# Patient Record
Sex: FEMALE | Race: WHITE | Employment: OTHER | URBAN - METROPOLITAN AREA
[De-identification: names, ages, dates, MRNs, and addresses within clinical notes are randomized per-mention and may not be internally consistent; named-entity substitution may affect disease eponyms.]

---

## 2021-03-14 ENCOUNTER — APPOINTMENT (OUTPATIENT)
Dept: CT IMAGING | Age: 86
DRG: 378 | End: 2021-03-14
Payer: MEDICARE

## 2021-03-14 ENCOUNTER — HOSPITAL ENCOUNTER (INPATIENT)
Age: 86
LOS: 3 days | Discharge: HOME HEALTH | DRG: 378 | End: 2021-03-17
Attending: EMERGENCY MEDICINE | Admitting: INTERNAL MEDICINE
Payer: MEDICARE

## 2021-03-14 ENCOUNTER — APPOINTMENT (OUTPATIENT)
Dept: GENERAL RADIOLOGY | Age: 86
DRG: 378 | End: 2021-03-14
Payer: MEDICARE

## 2021-03-14 DIAGNOSIS — K62.5 RECTAL BLEEDING: Primary | ICD-10-CM

## 2021-03-14 PROBLEM — K92.2 GI BLEED: Status: ACTIVE | Noted: 2021-03-14

## 2021-03-14 LAB
ALBUMIN SERPL-MCNC: 3.3 GM/DL (ref 3.4–5)
ALP BLD-CCNC: 50 IU/L (ref 40–129)
ALT SERPL-CCNC: 17 U/L (ref 10–40)
ANION GAP SERPL CALCULATED.3IONS-SCNC: 9 MMOL/L (ref 4–16)
APTT: 24.8 SECONDS (ref 25.1–37.1)
AST SERPL-CCNC: 21 IU/L (ref 15–37)
BASOPHILS ABSOLUTE: 0.1 K/CU MM
BASOPHILS RELATIVE PERCENT: 0.8 % (ref 0–1)
BILIRUB SERPL-MCNC: 0.4 MG/DL (ref 0–1)
BUN BLDV-MCNC: 26 MG/DL (ref 6–23)
CALCIUM SERPL-MCNC: 8.4 MG/DL (ref 8.3–10.6)
CHLORIDE BLD-SCNC: 104 MMOL/L (ref 99–110)
CO2: 26 MMOL/L (ref 21–32)
CREAT SERPL-MCNC: 0.9 MG/DL (ref 0.6–1.1)
DIFFERENTIAL TYPE: ABNORMAL
EOSINOPHILS ABSOLUTE: 0.1 K/CU MM
EOSINOPHILS RELATIVE PERCENT: 1.6 % (ref 0–3)
GFR AFRICAN AMERICAN: >60 ML/MIN/1.73M2
GFR NON-AFRICAN AMERICAN: 58 ML/MIN/1.73M2
GLUCOSE BLD-MCNC: 110 MG/DL (ref 70–99)
HCT VFR BLD CALC: 35.5 % (ref 37–47)
HCT VFR BLD CALC: 39.6 % (ref 37–47)
HEMOGLOBIN: 11.6 GM/DL (ref 12.5–16)
HEMOGLOBIN: 12.5 GM/DL (ref 12.5–16)
IMMATURE NEUTROPHIL %: 0.4 % (ref 0–0.43)
INR BLD: 1.03 INDEX
LYMPHOCYTES ABSOLUTE: 1.5 K/CU MM
LYMPHOCYTES RELATIVE PERCENT: 19.4 % (ref 24–44)
MCH RBC QN AUTO: 32.3 PG (ref 27–31)
MCHC RBC AUTO-ENTMCNC: 31.6 % (ref 32–36)
MCV RBC AUTO: 102.3 FL (ref 78–100)
MONOCYTES ABSOLUTE: 0.6 K/CU MM
MONOCYTES RELATIVE PERCENT: 7.4 % (ref 0–4)
NUCLEATED RBC %: 0 %
PDW BLD-RTO: 13.5 % (ref 11.7–14.9)
PLATELET # BLD: 231 K/CU MM (ref 140–440)
PMV BLD AUTO: 10.4 FL (ref 7.5–11.1)
POTASSIUM SERPL-SCNC: 3.9 MMOL/L (ref 3.5–5.1)
PROTHROMBIN TIME: 12.5 SECONDS (ref 11.7–14.5)
RBC # BLD: 3.87 M/CU MM (ref 4.2–5.4)
SEGMENTED NEUTROPHILS ABSOLUTE COUNT: 5.4 K/CU MM
SEGMENTED NEUTROPHILS RELATIVE PERCENT: 70.4 % (ref 36–66)
SODIUM BLD-SCNC: 139 MMOL/L (ref 135–145)
TOTAL IMMATURE NEUTOROPHIL: 0.03 K/CU MM
TOTAL NUCLEATED RBC: 0 K/CU MM
TOTAL PROTEIN: 5.8 GM/DL (ref 6.4–8.2)
WBC # BLD: 7.7 K/CU MM (ref 4–10.5)

## 2021-03-14 PROCEDURE — 85018 HEMOGLOBIN: CPT

## 2021-03-14 PROCEDURE — 85025 COMPLETE CBC W/AUTO DIFF WBC: CPT

## 2021-03-14 PROCEDURE — 6370000000 HC RX 637 (ALT 250 FOR IP): Performed by: INTERNAL MEDICINE

## 2021-03-14 PROCEDURE — 36415 COLL VENOUS BLD VENIPUNCTURE: CPT

## 2021-03-14 PROCEDURE — 85014 HEMATOCRIT: CPT

## 2021-03-14 PROCEDURE — 94761 N-INVAS EAR/PLS OXIMETRY MLT: CPT

## 2021-03-14 PROCEDURE — 99285 EMERGENCY DEPT VISIT HI MDM: CPT

## 2021-03-14 PROCEDURE — 85730 THROMBOPLASTIN TIME PARTIAL: CPT

## 2021-03-14 PROCEDURE — 74174 CTA ABD&PLVS W/CONTRAST: CPT

## 2021-03-14 PROCEDURE — C9113 INJ PANTOPRAZOLE SODIUM, VIA: HCPCS | Performed by: PHYSICIAN ASSISTANT

## 2021-03-14 PROCEDURE — 71045 X-RAY EXAM CHEST 1 VIEW: CPT

## 2021-03-14 PROCEDURE — 6360000004 HC RX CONTRAST MEDICATION: Performed by: EMERGENCY MEDICINE

## 2021-03-14 PROCEDURE — 85610 PROTHROMBIN TIME: CPT

## 2021-03-14 PROCEDURE — 1200000000 HC SEMI PRIVATE

## 2021-03-14 PROCEDURE — 6360000002 HC RX W HCPCS: Performed by: INTERNAL MEDICINE

## 2021-03-14 PROCEDURE — 6370000000 HC RX 637 (ALT 250 FOR IP): Performed by: PHYSICIAN ASSISTANT

## 2021-03-14 PROCEDURE — 96374 THER/PROPH/DIAG INJ IV PUSH: CPT

## 2021-03-14 PROCEDURE — 80053 COMPREHEN METABOLIC PANEL: CPT

## 2021-03-14 PROCEDURE — 93005 ELECTROCARDIOGRAM TRACING: CPT | Performed by: PHYSICIAN ASSISTANT

## 2021-03-14 PROCEDURE — 6360000002 HC RX W HCPCS: Performed by: PHYSICIAN ASSISTANT

## 2021-03-14 PROCEDURE — 93010 ELECTROCARDIOGRAM REPORT: CPT | Performed by: INTERNAL MEDICINE

## 2021-03-14 PROCEDURE — C9113 INJ PANTOPRAZOLE SODIUM, VIA: HCPCS | Performed by: INTERNAL MEDICINE

## 2021-03-14 RX ORDER — PANTOPRAZOLE SODIUM 40 MG/10ML
40 INJECTION, POWDER, LYOPHILIZED, FOR SOLUTION INTRAVENOUS 2 TIMES DAILY
Status: DISCONTINUED | OUTPATIENT
Start: 2021-03-14 | End: 2021-03-17

## 2021-03-14 RX ORDER — TELMISARTAN 80 MG/1
80 TABLET ORAL DAILY
COMMUNITY

## 2021-03-14 RX ORDER — MULTIVIT-MIN/IRON/FOLIC ACID/K 18-600-40
2000 CAPSULE ORAL NIGHTLY
COMMUNITY

## 2021-03-14 RX ORDER — ASCORBIC ACID 500 MG
1000 TABLET ORAL DAILY
Status: DISCONTINUED | OUTPATIENT
Start: 2021-03-14 | End: 2021-03-17 | Stop reason: HOSPADM

## 2021-03-14 RX ORDER — ONDANSETRON 2 MG/ML
4 INJECTION INTRAMUSCULAR; INTRAVENOUS EVERY 6 HOURS PRN
Status: DISCONTINUED | OUTPATIENT
Start: 2021-03-14 | End: 2021-03-17 | Stop reason: HOSPADM

## 2021-03-14 RX ORDER — ACETAMINOPHEN 325 MG/1
650 TABLET ORAL EVERY 6 HOURS PRN
Status: DISCONTINUED | OUTPATIENT
Start: 2021-03-14 | End: 2021-03-17 | Stop reason: HOSPADM

## 2021-03-14 RX ORDER — SODIUM CHLORIDE 0.9 % (FLUSH) 0.9 %
10 SYRINGE (ML) INJECTION EVERY 12 HOURS SCHEDULED
Status: DISCONTINUED | OUTPATIENT
Start: 2021-03-14 | End: 2021-03-17 | Stop reason: HOSPADM

## 2021-03-14 RX ORDER — PROMETHAZINE HYDROCHLORIDE 12.5 MG/1
12.5 TABLET ORAL EVERY 6 HOURS PRN
Status: DISCONTINUED | OUTPATIENT
Start: 2021-03-14 | End: 2021-03-17 | Stop reason: HOSPADM

## 2021-03-14 RX ORDER — ACETAMINOPHEN 650 MG/1
650 SUPPOSITORY RECTAL EVERY 6 HOURS PRN
Status: DISCONTINUED | OUTPATIENT
Start: 2021-03-14 | End: 2021-03-17 | Stop reason: HOSPADM

## 2021-03-14 RX ORDER — DILTIAZEM HYDROCHLORIDE 240 MG/1
240 CAPSULE, COATED, EXTENDED RELEASE ORAL DAILY
Status: DISCONTINUED | OUTPATIENT
Start: 2021-03-15 | End: 2021-03-17 | Stop reason: HOSPADM

## 2021-03-14 RX ORDER — PANTOPRAZOLE SODIUM 40 MG/10ML
40 INJECTION, POWDER, LYOPHILIZED, FOR SOLUTION INTRAVENOUS ONCE
Status: COMPLETED | OUTPATIENT
Start: 2021-03-14 | End: 2021-03-14

## 2021-03-14 RX ORDER — LEVOTHYROXINE SODIUM 0.07 MG/1
75 TABLET ORAL DAILY
COMMUNITY

## 2021-03-14 RX ORDER — GABAPENTIN 300 MG/1
300 CAPSULE ORAL NIGHTLY
COMMUNITY

## 2021-03-14 RX ORDER — MULTIVIT WITH MINERALS/LUTEIN
1000 TABLET ORAL DAILY
COMMUNITY

## 2021-03-14 RX ORDER — ASPIRIN 81 MG/1
81 TABLET, CHEWABLE ORAL DAILY
COMMUNITY

## 2021-03-14 RX ORDER — MAGNESIUM GLUCONATE 27 MG(500)
500 TABLET ORAL DAILY
COMMUNITY

## 2021-03-14 RX ORDER — LEVOTHYROXINE SODIUM 0.07 MG/1
75 TABLET ORAL DAILY
Status: DISCONTINUED | OUTPATIENT
Start: 2021-03-15 | End: 2021-03-17 | Stop reason: HOSPADM

## 2021-03-14 RX ORDER — SODIUM CHLORIDE 0.9 % (FLUSH) 0.9 %
10 SYRINGE (ML) INJECTION PRN
Status: DISCONTINUED | OUTPATIENT
Start: 2021-03-14 | End: 2021-03-17 | Stop reason: HOSPADM

## 2021-03-14 RX ORDER — MULTIVIT-MIN/IRON/FOLIC ACID/K 18-600-40
20000 CAPSULE ORAL NIGHTLY
Status: DISCONTINUED | OUTPATIENT
Start: 2021-03-14 | End: 2021-03-14

## 2021-03-14 RX ORDER — DILTIAZEM HYDROCHLORIDE 240 MG/1
240 CAPSULE, EXTENDED RELEASE ORAL DAILY
Status: ON HOLD | COMMUNITY
End: 2021-04-02 | Stop reason: HOSPADM

## 2021-03-14 RX ORDER — ACETAMINOPHEN 500 MG
500 TABLET ORAL ONCE
Status: COMPLETED | OUTPATIENT
Start: 2021-03-14 | End: 2021-03-14

## 2021-03-14 RX ORDER — GABAPENTIN 300 MG/1
300 CAPSULE ORAL NIGHTLY
Status: DISCONTINUED | OUTPATIENT
Start: 2021-03-14 | End: 2021-03-17 | Stop reason: HOSPADM

## 2021-03-14 RX ADMIN — PANTOPRAZOLE SODIUM 40 MG: 40 INJECTION, POWDER, FOR SOLUTION INTRAVENOUS at 18:06

## 2021-03-14 RX ADMIN — OXYCODONE HYDROCHLORIDE AND ACETAMINOPHEN 1000 MG: 500 TABLET ORAL at 18:06

## 2021-03-14 RX ADMIN — PANTOPRAZOLE SODIUM 40 MG: 40 INJECTION, POWDER, FOR SOLUTION INTRAVENOUS at 10:29

## 2021-03-14 RX ADMIN — ACETAMINOPHEN 500 MG: 500 TABLET ORAL at 11:41

## 2021-03-14 RX ADMIN — IOPAMIDOL 85 ML: 755 INJECTION, SOLUTION INTRAVENOUS at 11:16

## 2021-03-14 RX ADMIN — GABAPENTIN 300 MG: 300 CAPSULE ORAL at 21:34

## 2021-03-14 RX ADMIN — ACETAMINOPHEN 650 MG: 325 TABLET ORAL at 21:34

## 2021-03-14 SDOH — HEALTH STABILITY: MENTAL HEALTH: HOW OFTEN DO YOU HAVE A DRINK CONTAINING ALCOHOL?: NEVER

## 2021-03-14 ASSESSMENT — PAIN DESCRIPTION - ORIENTATION: ORIENTATION: LEFT

## 2021-03-14 ASSESSMENT — PAIN DESCRIPTION - DESCRIPTORS: DESCRIPTORS: ACHING

## 2021-03-14 ASSESSMENT — PAIN SCALES - GENERAL
PAINLEVEL_OUTOF10: 10
PAINLEVEL_OUTOF10: 5
PAINLEVEL_OUTOF10: 10
PAINLEVEL_OUTOF10: 0

## 2021-03-14 NOTE — CARE COORDINATION
LSW was consulted to assist this pt with discharge planning. Pt here today for GI bleeding. She is status post hysterectomy. This is pt's 1st visit to this hospital.    Pt's son notes that she has had several recent falls, most recent fall was in January of this year and still has some bruising and pain from these falls to her left hip, right elbow. Pt has seen Masoud blankenship for falls. LSW spoke to this pt and pt's son, Brittney Cancino, [phone] also in room. Pt gave verbal permission to speak openly. LSW explained CM role. Brittney Cancino is pt's POA and copy given to regethantion as well as LW and both scanned to chart. LSW completed ACP w/pt and she wants to be a DNR/CC. Pt reports she is  and lives alone in a 1 story home. She has some blindness but is able to see objects and self medicates; after Brittney Cancino fills out weekly pill box, which she takes her daily meds from. Brittney Cancino notes how he gonzalez things in her house to see, like microwave buttons, so she can see them. This has been working. Pt does have insurance and can afford her medications. Pt has transportation via family, usually Brittney Cancino. Pt does have a PCP. Pt has the following DME: seated walker w/wheels, BSC, shower chair and craftmatic bed. Pt has [employer] system to call for 911 as well as [place] phone. Héctor villa lives a few doors down and does check on pt and came this am when he heard the 911 call. Brittney Cancino noted there are also other neighbors who can check on pt if needed and he lives 30 minutes away, but sees pt every Friday. He takes her to get her hair done and buys her groceries. Pt is able to prepare simple meals and and he buys her [employer] famous chicken every Friday too, which she will eat throughout the week. Pt does have PP HHA to assist her and her grand dtr also helps out w/bathing. Pt reports she use to go to activities 4 x week @ [facility] but since Covid, she has not gone @ all.  Brittney Cancino noted pt's last fall was 1/29/21 and talked about possible need for PT/OT. He noted HHC would be likely option even if pt requires admission. He does not anticipate any needs for pt @ d/c, whether from ED or IP. LSW did talk about [facility] as well as [facility]-Passport as pt may need these in future and Shraddha José took hand-outs for these services. Again, no other needs noted @ this time. Chiki Reece 1313 notes completed to Physician-hospitalist that pt wants to be DNR/CC. GI consulted in ED and recommended Protonix and admission. Pt being admitted for GI bleed.

## 2021-03-14 NOTE — ED NOTES
Bed: ED-19  Expected date:   Expected time:   Means of arrival:   Comments:  [facility]       Keerthi Barger   03/14/21 4487

## 2021-03-14 NOTE — H&P
past and son says that patient has remained very healthy over the course of her life, only going to see a doctor when needed. Patient recently has been tolerating her diet. Son does state that the individual doing her laundry did notice blood stains in her underwear last week. Ten point ROS reviewed negative, unless as noted above. In ED patient was hypertensive but other vitals were stable. Pertinent labs showed a hemoglobin of 12.5, INR 1.03. CTA A/P showed diverticulosis. Objective:   No intake or output data in the 24 hours ending 03/14/21 1301   Vitals:   Vitals:    03/14/21 1115   BP: (!) 191/62   Pulse: 74   Resp: 20   Temp:    SpO2:      Physical Exam:   GEN Awake female, sitting upright in bed in no apparent distress. Appears given age. EYES Pupils are equally round. No scleral erythema, discharge, or conjunctivitis. HENT Mucous membranes are moist.   NECK Supple, no apparent thyromegaly or masses. RESP Clear to auscultation, no wheezes, rales or rhonchi. CARDIO/VASC S1/S2 auscultated. Regular rate without appreciable murmurs, rubs, or gallops. GI Abdomen is soft without significant tenderness, masses, or guarding   No costovertebral angle tenderness  HEME/LYMPH No petechiae or ecchymoses. MSK No gross joint deformities. SKIN Normal coloration, warm, dry. NEURO Cranial nerves appear grossly intact, normal speech. PSYCH Awake, alert, oriented, affect appropriate    Past Medical History:      Past Medical History:   Diagnosis Date    Back pain     Hyperlipidemia     Hypertension     Hypothyroidism      PSHX:  has a past surgical history that includes Hysterectomy. Allergies: No Known Allergies    FAM HX: Reviewed, non contributory  Soc HX:   Social History     Socioeconomic History    Marital status:       Spouse name: None    Number of children: None    Years of education: None    Highest education level: None   Occupational History    None   Social Needs    Financial resource strain: None    Food insecurity     Worry: None     Inability: None    Transportation needs     Medical: None     Non-medical: None   Tobacco Use    Smoking status: Never Smoker    Smokeless tobacco: Never Used   Substance and Sexual Activity    Alcohol use: Never     Frequency: Never    Drug use: Never    Sexual activity: None   Lifestyle    Physical activity     Days per week: None     Minutes per session: None    Stress: None   Relationships    Social connections     Talks on phone: None     Gets together: None     Attends Episcopalian service: None     Active member of club or organization: None     Attends meetings of clubs or organizations: None     Relationship status: None    Intimate partner violence     Fear of current or ex partner: None     Emotionally abused: None     Physically abused: None     Forced sexual activity: None   Other Topics Concern    None   Social History Narrative    None       Medications:   Medications:    Infusions:   PRN Meds:       Electronically signed by Fatemeh Mathews MD on 3/14/2021 at 1:01 PM

## 2021-03-14 NOTE — ED TRIAGE NOTES
Patient to the ED with complaints of rectal bleeding and left leg pain. Patient reports that rectal bleeding began yesterday, noted to be bright red blood per patient and EMS. Patient denies abdominal pain. Son reports several falls (Nov 2020-Jan 2021), and patient has been complaining of left leg pain since, has been seen by ortho since this time. Rates pain 10/10.

## 2021-03-14 NOTE — ACP (ADVANCE CARE PLANNING)
Advance Care Planning     Advance Care Planning Activator (Inpatient)  Conversation Note      Date of ACP Conversation: 3/14/2021    Conversation Conducted with: Patient with Decision Making Capacity    ACP Activator: Joanna Brown Decision Maker:     Current Designated Health Care Decision Maker:     Primary Decision Maker (Active): Loi Lovett - Child - [phone]  Click here to complete Healthcare Decision Makers including section of the Healthcare Decision Maker Relationship (ie \"Primary\")  Today we documented Decision Maker(s) consistent with ACP documents on file. Care Preferences    Ventilation: \"If you were in your present state of health and suddenly became very ill and were unable to breathe on your own, what would your preference be about the use of a ventilator (breathing machine) if it were available to you? \"      Would the patient desire the use of ventilator (breathing machine)?: no    \"If your health worsens and it becomes clear that your chance of recovery is unlikely, what would your preference be about the use of a ventilator (breathing machine) if it were available to you? \"     Would the patient desire the use of ventilator (breathing machine)?: No      Resuscitation  \"CPR works best to restart the heart when there is a sudden event, like a heart attack, in someone who is otherwise healthy. Unfortunately, CPR does not typically restart the heart for people who have serious health conditions or who are very sick. \"    \"In the event your heart stopped as a result of an underlying serious health condition, would you want attempts to be made to restart your heart (answer \"yes\" for attempt to resuscitate) or would you prefer a natural death (answer \"no\" for do not attempt to resuscitate)? \" no       [] Yes   [] No   Educated Patient / Kristi Omar regarding differences between Advance Directives and portable DNR orders.     Length of ACP Conversation in minutes:  15 Conversation Outcomes:  [x] ACP discussion completed  [] Existing advance directive reviewed with patient; no changes to patient's previously recorded wishes  [] New Advance Directive completed  [] Portable Do Not Rescitate prepared for Provider review and signature  [] POLST/POST/MOLST/MOST prepared for Provider review and signature      Follow-up plan:    [] Schedule follow-up conversation to continue planning  [x] Referred individual to Provider for additional questions/concerns   [] Advised patient/agent/surrogate to review completed ACP document and update if needed with changes in condition, patient preferences or care setting    [x] This note routed to one or more involved healthcare providers      Pt's son, Bubba Pruett noted they need to meet w/pt's PCP so they can have DNR/CC completed, as this is pt's wishes.

## 2021-03-14 NOTE — ED PROVIDER NOTES
I independently examined and evaluated Rubi Byrd. In brief their history revealed patient is here with bright red blood per rectum. Patient was in baseline state of health until this overnight into this morning when she says she awoke to incontinence of stool that was with bright red blood in it. Patient then proceeded to have further bright red bleeding this morning. Patient presently denies any significant abdominal pain but does report pain to her left leg which is chronic since recent falls over the last few months. Patient has been evaluated by orthopedics for this and is slowly recovering. No fall last night. Patient reports that she is not sure what medication she is on but son brings medications none appears that patient is only on aspirin for anticoagulation. Patient has never had a problem like this before. No chest pain or shortness of breath. No weakness. Patient reports \"I am ready to go home\". Son is at bedside he confirms the history. Their focused exam revealed the patient is afebrile, hypertensive and with relative hypoxemia on room air correcting with nasal cannula oxygen of 2 L. The patient appears well for age. Pleasant. Hard of hearing. Smiling. Mucous membranes are moist. Speech is clear. Breathing is unlabored. Abdomen is slightly full and with some mid abdominal and lower abdominal tenderness to palpation. There is no generalized peritoneal signs. No rebound or guarding. Skin is dry. Patient does have some bruising to her left lateral posterior hip which appears old. Mental status is normal. The patient moves all extremities and is without facial droop.      Results for orders placed or performed during the hospital encounter of 03/14/21   CBC auto diff   Result Value Ref Range    WBC 7.7 4.0 - 10.5 K/CU MM    RBC 3.87 (L) 4.2 - 5.4 M/CU MM    Hemoglobin 12.5 12.5 - 16.0 GM/DL    Hematocrit 39.6 37 - 47 %    .3 (H) 78 - 100 FL    MCH 32.3 (H) 27 - 31 PG diagnostic, treatment, and disposition decisions were made by myself in conjunction with the Advanced Practice Provider. For all further details of the patient's emergency department visit, please see the Advanced Practice Provider's documentation.        Crystal Ashby MD  03/14/21 9852

## 2021-03-14 NOTE — ED NOTES
Rectal exam completed per Luis Enrique Grant with this nurse at bedside. Patient tolerated well.       John Bryan RN  03/14/21 1605

## 2021-03-14 NOTE — PLAN OF CARE
Problem: Falls - Risk of:  Goal: Will remain free from falls  Description: Will remain free from falls  Outcome: Ongoing  Goal: Absence of physical injury  Description: Absence of physical injury  Outcome: Ongoing     Problem: Discharge Planning:  Goal: Discharged to appropriate level of care  Description: Discharged to appropriate level of care  Outcome: Ongoing     Problem:  Bowel Function - Altered:  Goal: Bowel elimination is within specified parameters  Description: Bowel elimination is within specified parameters  Outcome: Ongoing     Problem: Fluid Volume - Imbalance:  Goal: Will show no signs and symptoms of excessive bleeding  Description: Will show no signs and symptoms of excessive bleeding  Outcome: Ongoing  Goal: Absence of imbalanced fluid volume signs and symptoms  Description: Absence of imbalanced fluid volume signs and symptoms  Outcome: Ongoing     Problem: Nausea/Vomiting:  Goal: Absence of nausea/vomiting  Description: Absence of nausea/vomiting  Outcome: Ongoing  Goal: Able to drink  Description: Able to drink  Outcome: Ongoing  Goal: Able to eat  Description: Able to eat  Outcome: Ongoing  Goal: Ability to achieve adequate nutritional intake will improve  Description: Ability to achieve adequate nutritional intake will improve  Outcome: Ongoing

## 2021-03-14 NOTE — ED NOTES
Bed: 01TR-01  Expected date:   Expected time:   Means of arrival:   Comments:  Carie Glass RN  03/14/21 0828

## 2021-03-14 NOTE — ED PROVIDER NOTES
eMERGENCY dEPARTMENT eNCOUnter      PCP: Radha Day MD    CHIEF COMPLAINT    Chief Complaint   Patient presents with    Rectal Bleeding     since last night     Leg Pain     left side, fall , already seen ortho       HPI    Ender White is a [place]  [age] y.o. female who presents with son via EMS with GI bleeding. Patient states that she awoke this morning after having a bloody bowel movement in her bed. She ambulated to the restroom and had dripping of bright red blood from her room into the restroom where she had an additional episode of bloody stool. She called her son who drove in, saw what had happened and called EMS. Patient denies any associated abdominal pain, chest pain, shortness of breath, lightheadedness or dizziness. No history of GI bleeding in the past.  She is on a baby aspirin daily. She is status post hysterectomy, denies any other previous abdominal surgeries. Son notes that she has had several recent falls, most recent fall was in January of this year and still has some bruising and pain from these falls to her left hip, right elbow. REVIEW OF SYSTEMS    Constitutional:  Denies fever, chills, weight loss or weakness   HENT:  Denies sore throat or ear pain   Cardiovascular:  Denies chest pain, palpitations or swelling   Respiratory:  Denies cough or shortness of breath   GI:  See HPI above  : No hematuria or dysuria. No vaginal symptoms. Musculoskeletal:  Denies back pain or groin pain or masses.  See HPI  Skin:  Denies rash  Neurologic:  Denies headache, focal weakness or sensory changes   Endocrine:  Denies polyuria or polydypsia   Lymphatic:  Denies swollen glands     All other review of systems are negative  See HPI and nursing notes for additional information     PAST MEDICAL & SURGICAL HISTORY    Past Medical History:   Diagnosis Date    Back pain     Hyperlipidemia     Hypertension     Hypothyroidism      Past Surgical History:   Procedure Laterality Date    HYSTERECTOMY         CURRENT MEDICATIONS    Current Outpatient Rx   Medication Sig Dispense Refill    dilTIAZem (DILACOR XR) 240 MG extended release capsule Take 240 mg by mouth daily      telmisartan (MICARDIS) 80 MG tablet Take 80 mg by mouth daily      gabapentin (NEURONTIN) 300 MG capsule Take 300 mg by mouth nightly.  levothyroxine (SYNTHROID) 75 MCG tablet Take 75 mcg by mouth Daily      aspirin 81 MG chewable tablet Take 81 mg by mouth daily      magnesium gluconate (MAGONATE) 500 MG tablet Take 500 mg by mouth daily      Ascorbic Acid (VITAMIN C) 250 MG tablet Take 1,000 mg by mouth daily      Cholecalciferol (VITAMIN D) 50 MCG (2000 UT) CAPS capsule Take 20,000 Units by mouth nightly      Multiple Vitamins-Minerals (WOMENS DAILY FORMULA PO) Take 1 tablet by mouth daily         ALLERGIES    No Known Allergies    SOCIAL AND FAMILY HISTORY    Social History     Socioeconomic History    Marital status:       Spouse name: None    Number of children: None    Years of education: None    Highest education level: None   Occupational History    None   Social Needs    Financial resource strain: None    Food insecurity     Worry: None     Inability: None    Transportation needs     Medical: None     Non-medical: None   Tobacco Use    Smoking status: Never Smoker    Smokeless tobacco: Never Used   Substance and Sexual Activity    Alcohol use: Never     Frequency: Never    Drug use: Never    Sexual activity: None   Lifestyle    Physical activity     Days per week: None     Minutes per session: None    Stress: None   Relationships    Social connections     Talks on phone: None     Gets together: None     Attends Buddhism service: None     Active member of club or organization: None     Attends meetings of clubs or organizations: None     Relationship status: None    Intimate partner violence     Fear of current or ex partner: None     Emotionally abused: None     Physically abused: None     Forced sexual activity: None   Other Topics Concern    None   Social History Narrative    None     History reviewed. No pertinent family history. PHYSICAL EXAM    VITAL SIGNS: BP (!) 191/62   Pulse 74   Temp 98.1 °F (36.7 °C) (Oral)   Resp 20   SpO2 93%   Constitutional:  Well developed, well nourished. No distress  Eyes:  Sclera nonicteric, conjunctiva moist  HENT:  Atraumatic. PERRL. EOMI. Moist mucus membranes. Neck/Lymphatics: supple, no JVD, no swollen nodes  Respiratory:  No retractions, no accessory muscle use, normal breath sounds   Cardiovascular:   normal rate, normal rhythm, no murmurs    GI:    Right half dollar sized area of bruising to lower abdomen    Bowel sounds present, no audible bruits. Soft,  no distention, no guarding, no rigidity,   no abdominal tenderness, no rebound tenderness, no palpable pulsatile masses,   No McBurney's point tenderness   Negative Rovsing sign    Negative Smith's sign. Back:   No CVA tenderness to percussion. Musculoskeletal:  No edema, no deformity  Bruising to left hip  Vascular: DP pulses 2+ equal bilaterally  Integument: Intertriginous rash, dry  Neurologic:  Alert & oriented, normal speech  Psychiatric: Cooperative, pleasant affect         ______________________________________________________________________    Procedures:   Female nurse present. Rectal exam was performed by myself and was negative for significant tenderness. External hemorrhoid. No active bleeding or thrombosis noted. Sphincter tone intact.  + gross blood.      ______________________________________________________________________      LABS:  Results for orders placed or performed during the hospital encounter of 03/14/21   CBC auto diff   Result Value Ref Range    WBC 7.7 4.0 - 10.5 K/CU MM    RBC 3.87 (L) 4.2 - 5.4 M/CU MM    Hemoglobin 12.5 12.5 - 16.0 GM/DL    Hematocrit 39.6 37 - 47 %    .3 (H) 78 - 100 FL    MCH 32.3 (H) 27 - 31 PG    MCHC 31.6 (L) 32.0 - 36.0 %    RDW 13.5 11.7 - 14.9 %    Platelets 475 555 - 943 K/CU MM    MPV 10.4 7.5 - 11.1 FL    Differential Type AUTOMATED DIFFERENTIAL     Segs Relative 70.4 (H) 36 - 66 %    Lymphocytes % 19.4 (L) 24 - 44 %    Monocytes % 7.4 (H) 0 - 4 %    Eosinophils % 1.6 0 - 3 %    Basophils % 0.8 0 - 1 %    Segs Absolute 5.4 K/CU MM    Lymphocytes Absolute 1.5 K/CU MM    Monocytes Absolute 0.6 K/CU MM    Eosinophils Absolute 0.1 K/CU MM    Basophils Absolute 0.1 K/CU MM    Nucleated RBC % 0.0 %    Total Nucleated RBC 0.0 K/CU MM    Total Immature Neutrophil 0.03 K/CU MM    Immature Neutrophil % 0.4 0 - 0.43 %   CMP   Result Value Ref Range    Sodium 139 135 - 145 MMOL/L    Potassium 3.9 3.5 - 5.1 MMOL/L    Chloride 104 99 - 110 mMol/L    CO2 26 21 - 32 MMOL/L    BUN 26 (H) 6 - 23 MG/DL    CREATININE 0.9 0.6 - 1.1 MG/DL    Glucose 110 (H) 70 - 99 MG/DL    Calcium 8.4 8.3 - 10.6 MG/DL    Albumin 3.3 (L) 3.4 - 5.0 GM/DL    Total Protein 5.8 (L) 6.4 - 8.2 GM/DL    Total Bilirubin 0.4 0.0 - 1.0 MG/DL    ALT 17 10 - 40 U/L    AST 21 15 - 37 IU/L    Alkaline Phosphatase 50 40 - 129 IU/L    GFR Non- 58 (L) >60 mL/min/1.73m2    GFR African American >60 >60 mL/min/1.73m2    Anion Gap 9 4 - 16   PT - INR   Result Value Ref Range    Protime 12.5 11.7 - 14.5 SECONDS    INR 1.03 INDEX   APTT   Result Value Ref Range    aPTT 24.8 (L) 25.1 - 37.1 SECONDS   EKG 12 Lead   Result Value Ref Range    Ventricular Rate 75 BPM    Atrial Rate 75 BPM    P-R Interval 202 ms    QRS Duration 82 ms    Q-T Interval 362 ms    QTc Calculation (Bazett) 404 ms    P Axis 19 degrees    R Axis -16 degrees    T Axis 15 degrees    Diagnosis       Normal sinus rhythm  Inferior infarct , age undetermined  Abnormal ECG  No previous ECGs available           RADIOLOGY/PROCEDURES    XR CHEST PORTABLE   Final Result   No acute abnormality. CTA ABDOMEN PELVIS W CONTRAST   Preliminary Result   1.  No evidence of active arterial bleeding on arterial phase imaging. Mildly   prominent arterial vessels in the anterior wall of the rectum and anus. 2. Moderately severe aortoiliac atherosclerotic disease. 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 No significant inflow disease. Bilateral proximal SFA   stenoses. 3. 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 Heavy plaque in the proximal renal   arteries limits evaluation. 4. Colonic diverticulosis with no acute features. ED COURSE & MEDICAL DECISION MAKING       Vital signs and nursing notes reviewed during ED course. Patient care and presentation staffed with supervising MD.  Patient seen by supervising MD today- see his/her note for details of the encounter. Patient presents as above with multiple presents with bright red blood per rectum this morning. She is hypertensive on arrival, afebrile, tachycardic. Abdomen soft, no appreciable tenderness on my exam.  She is mildly hypoxic in the low 90s on room air and placed on 2 L of oxygen. She is given dose of Protonix. Lab work with hemoglobin of 12.5. No leukocytosis. BUN elevated at 26 with normal creatinine. Chest x-ray without acute process. CT imaging of abdomen and pelvis without evidence of active arterial bleeding. There is mildly prominent arterial vessels in the anterior wall of the rectum and anus. Incidental finding of moderately severe aortoiliac atherosclerotic disease. 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 No significant inflow disease. Bilateral proximal SFA stenosis. Chronic diverticulosis without acute features. Patient remains hemodynamically stable throughout ED stay. I discussed patient case with on-call gastroenterologist, Dr. Pallavi Peña. Plan admission for continued observation, she will likely need serial H&H's.   I discussed patient case with hospitalist who is agreeable with admission for

## 2021-03-14 NOTE — ED NOTES
Patient able to stand and pivot onto bedside commode without incident. Continues to complain of pain to left leg, requesting something for pain. Tutu Christianma notified.       Juan Cobb RN  03/14/21 1135

## 2021-03-15 ENCOUNTER — APPOINTMENT (OUTPATIENT)
Dept: ULTRASOUND IMAGING | Age: 86
DRG: 378 | End: 2021-03-15
Payer: MEDICARE

## 2021-03-15 LAB
HCT VFR BLD CALC: 31.6 % (ref 37–47)
HCT VFR BLD CALC: 34.4 % (ref 37–47)
HEMOGLOBIN: 10.3 GM/DL (ref 12.5–16)
HEMOGLOBIN: 11 GM/DL (ref 12.5–16)
IRON: 67 UG/DL (ref 37–145)
PCT TRANSFERRIN: 31 % (ref 10–44)
SARS-COV-2, NAAT: NOT DETECTED
SOURCE: NORMAL
TOTAL IRON BINDING CAPACITY: 213 UG/DL (ref 250–450)
UNSATURATED IRON BINDING CAPACITY: 146 UG/DL (ref 110–370)

## 2021-03-15 PROCEDURE — 87635 SARS-COV-2 COVID-19 AMP PRB: CPT

## 2021-03-15 PROCEDURE — 2580000003 HC RX 258: Performed by: HOSPITALIST

## 2021-03-15 PROCEDURE — 97530 THERAPEUTIC ACTIVITIES: CPT

## 2021-03-15 PROCEDURE — 6370000000 HC RX 637 (ALT 250 FOR IP): Performed by: INTERNAL MEDICINE

## 2021-03-15 PROCEDURE — 36415 COLL VENOUS BLD VENIPUNCTURE: CPT

## 2021-03-15 PROCEDURE — C9113 INJ PANTOPRAZOLE SODIUM, VIA: HCPCS | Performed by: INTERNAL MEDICINE

## 2021-03-15 PROCEDURE — 85014 HEMATOCRIT: CPT

## 2021-03-15 PROCEDURE — 97166 OT EVAL MOD COMPLEX 45 MIN: CPT

## 2021-03-15 PROCEDURE — 93971 EXTREMITY STUDY: CPT

## 2021-03-15 PROCEDURE — 6360000002 HC RX W HCPCS: Performed by: INTERNAL MEDICINE

## 2021-03-15 PROCEDURE — 6370000000 HC RX 637 (ALT 250 FOR IP): Performed by: HOSPITALIST

## 2021-03-15 PROCEDURE — 1200000000 HC SEMI PRIVATE

## 2021-03-15 PROCEDURE — 97162 PT EVAL MOD COMPLEX 30 MIN: CPT

## 2021-03-15 PROCEDURE — 97535 SELF CARE MNGMENT TRAINING: CPT

## 2021-03-15 PROCEDURE — 97116 GAIT TRAINING THERAPY: CPT

## 2021-03-15 PROCEDURE — 2580000003 HC RX 258: Performed by: INTERNAL MEDICINE

## 2021-03-15 PROCEDURE — 94761 N-INVAS EAR/PLS OXIMETRY MLT: CPT

## 2021-03-15 PROCEDURE — 83550 IRON BINDING TEST: CPT

## 2021-03-15 PROCEDURE — 6370000000 HC RX 637 (ALT 250 FOR IP): Performed by: NURSE PRACTITIONER

## 2021-03-15 PROCEDURE — 85018 HEMOGLOBIN: CPT

## 2021-03-15 PROCEDURE — 2500000003 HC RX 250 WO HCPCS: Performed by: HOSPITALIST

## 2021-03-15 PROCEDURE — 83540 ASSAY OF IRON: CPT

## 2021-03-15 PROCEDURE — 6360000002 HC RX W HCPCS: Performed by: HOSPITALIST

## 2021-03-15 RX ORDER — HYDRALAZINE HYDROCHLORIDE 20 MG/ML
10 INJECTION INTRAMUSCULAR; INTRAVENOUS EVERY 6 HOURS PRN
Status: DISCONTINUED | OUTPATIENT
Start: 2021-03-15 | End: 2021-03-15 | Stop reason: ALTCHOICE

## 2021-03-15 RX ORDER — MAGNESIUM OXIDE 400 MG/1
400 TABLET ORAL DAILY
Status: DISCONTINUED | OUTPATIENT
Start: 2021-03-16 | End: 2021-03-17 | Stop reason: HOSPADM

## 2021-03-15 RX ORDER — TRAMADOL HYDROCHLORIDE 50 MG/1
25 TABLET ORAL EVERY 6 HOURS PRN
Status: DISCONTINUED | OUTPATIENT
Start: 2021-03-15 | End: 2021-03-17 | Stop reason: HOSPADM

## 2021-03-15 RX ORDER — LOSARTAN POTASSIUM 100 MG/1
100 TABLET ORAL DAILY
Status: DISCONTINUED | OUTPATIENT
Start: 2021-03-16 | End: 2021-03-17 | Stop reason: HOSPADM

## 2021-03-15 RX ORDER — POLYETHYLENE GLYCOL 3350 17 G/17G
238 POWDER, FOR SOLUTION ORAL ONCE
Status: DISCONTINUED | OUTPATIENT
Start: 2021-03-15 | End: 2021-03-15

## 2021-03-15 RX ORDER — POLYETHYLENE GLYCOL 3350 17 G/17G
238 POWDER, FOR SOLUTION ORAL ONCE
Status: COMPLETED | OUTPATIENT
Start: 2021-03-15 | End: 2021-03-15

## 2021-03-15 RX ORDER — HYDROCORTISONE ACETATE 25 MG/1
25 SUPPOSITORY RECTAL 2 TIMES DAILY
Status: DISCONTINUED | OUTPATIENT
Start: 2021-03-15 | End: 2021-03-17 | Stop reason: HOSPADM

## 2021-03-15 RX ADMIN — SODIUM CHLORIDE, PRESERVATIVE FREE 10 ML: 5 INJECTION INTRAVENOUS at 09:38

## 2021-03-15 RX ADMIN — PANTOPRAZOLE SODIUM 40 MG: 40 INJECTION, POWDER, FOR SOLUTION INTRAVENOUS at 21:09

## 2021-03-15 RX ADMIN — DILTIAZEM HYDROCHLORIDE 240 MG: 240 CAPSULE, COATED, EXTENDED RELEASE ORAL at 09:37

## 2021-03-15 RX ADMIN — HYDROCORTISONE ACETATE 25 MG: 25 SUPPOSITORY RECTAL at 10:10

## 2021-03-15 RX ADMIN — ONDANSETRON 4 MG: 2 INJECTION INTRAMUSCULAR; INTRAVENOUS at 18:53

## 2021-03-15 RX ADMIN — METOPROLOL TARTRATE 2.5 MG: 5 INJECTION INTRAVENOUS at 09:15

## 2021-03-15 RX ADMIN — SODIUM CHLORIDE, PRESERVATIVE FREE 10 ML: 5 INJECTION INTRAVENOUS at 21:09

## 2021-03-15 RX ADMIN — GABAPENTIN 300 MG: 300 CAPSULE ORAL at 21:09

## 2021-03-15 RX ADMIN — PANTOPRAZOLE SODIUM 40 MG: 40 INJECTION, POWDER, FOR SOLUTION INTRAVENOUS at 09:15

## 2021-03-15 RX ADMIN — HYDRALAZINE HYDROCHLORIDE 10 MG: 20 INJECTION, SOLUTION INTRAMUSCULAR; INTRAVENOUS at 11:30

## 2021-03-15 RX ADMIN — OXYCODONE HYDROCHLORIDE AND ACETAMINOPHEN 1000 MG: 500 TABLET ORAL at 09:37

## 2021-03-15 RX ADMIN — METOPROLOL TARTRATE 2.5 MG: 5 INJECTION INTRAVENOUS at 15:09

## 2021-03-15 RX ADMIN — TRAMADOL HYDROCHLORIDE 25 MG: 50 TABLET, FILM COATED ORAL at 16:03

## 2021-03-15 RX ADMIN — POLYETHYLENE GLYCOL 3350 238 G: 17 POWDER, FOR SOLUTION ORAL at 17:06

## 2021-03-15 ASSESSMENT — PAIN DESCRIPTION - LOCATION
LOCATION: LEG

## 2021-03-15 ASSESSMENT — PAIN DESCRIPTION - DESCRIPTORS
DESCRIPTORS: ACHING
DESCRIPTORS: ACHING

## 2021-03-15 ASSESSMENT — PAIN DESCRIPTION - ORIENTATION
ORIENTATION: LEFT
ORIENTATION: LEFT

## 2021-03-15 ASSESSMENT — PAIN DESCRIPTION - ONSET: ONSET: ON-GOING

## 2021-03-15 ASSESSMENT — PAIN DESCRIPTION - PROGRESSION: CLINICAL_PROGRESSION: GRADUALLY WORSENING

## 2021-03-15 ASSESSMENT — PAIN DESCRIPTION - PAIN TYPE
TYPE: CHRONIC PAIN
TYPE: CHRONIC PAIN

## 2021-03-15 ASSESSMENT — PAIN DESCRIPTION - FREQUENCY: FREQUENCY: CONTINUOUS

## 2021-03-15 ASSESSMENT — PAIN SCALES - GENERAL
PAINLEVEL_OUTOF10: 6
PAINLEVEL_OUTOF10: 4

## 2021-03-15 NOTE — PROGRESS NOTES
Notified by [facility] Doctor RN that pt had bright red blood per rectum. Called patient and son Sidney Begum (was at bedside) discussed POC and recommend c scope tomorrow am. Consent sign and faxed.    Start Bowel prep  Npo after midnight  COVID Rapid   Colonoscopy toorrow am    [address] [place] [address] Gastroenterology

## 2021-03-15 NOTE — CARE COORDINATION
Met c pt and son Chinyere Cleverly at bedside to initiate discharge planning. Prior to admit pt lives at home alone, was ind with ADLs including meal prep and taking own medication (set up weekly by son), has BSC that she uses on occasion. Pt has pcp/ active insurance/ can afford meds. Pt/son goal to return home upon discharge, possibly on 3/16 per Dr. Peter Rehman.  CM available if needs arise.

## 2021-03-15 NOTE — PROGRESS NOTES
Physical Therapy  Formerly [facility] ACUTE CARE PHYSICAL THERAPY EVALUATION  Vanessa Owens, 11/11/1920, [id number]/[id number], 3/15/2021    History  [place]:  The encounter diagnosis was Rectal bleeding. Patient  has a past medical history of Back pain, Hyperlipidemia, Hypertension, and Hypothyroidism. Patient  has a past surgical history that includes Hysterectomy. Subjective:  Patient states: \"You just got to keep moving\"     Pain:  Left hip, bruising and sore from a prior fall in January   Communication with other providers:  RN, co-eval with OTPatrice      Restrictions: general precautions, falls     Home Setup/Prior level of function  Social/Functional History  Lives With: Alone  Type of Home: House  Home Layout: One level  Home Access: Stairs to enter without rails  Entrance Stairs - Number of Steps: 1  Bathroom Shower/Tub: (has been sponge bathing)  Bathroom Toilet: Standard  Bathroom Equipment: 3-in-1 commode  Home Equipment: 4 wheeled walker  ADL Assistance: Independent  Homemaking Assistance: (son manages meds. Pt can typically light meal prep and do he own laundry. Reports her son is very helpful when she needs him)  Ambulation Assistance: Independent  Transfer Assistance: Independent  Active : No  Patient's  Info: son  Occupation: Retired  Type of occupation: banker    Examination of body systems (includes body structures/functions, activity/participation limitations):  · Observation: Sitting in [facility] upon arrival. Cooperative to work with therapy   · Vision:  Low vision, wears glasses but does not help much   · Hearing:  Slightly [place]   · Cardiopulmonary:  Stable vitals throughout session.    · Orientation: [facility]     Musculoskeletal  · ROM R/L:  WFL BLEs   · Strength R/L:  BLEs 4+/5     Mobility/treatment:   · Rolling L/R:  NT   · Supine to sit:  NT  · Transfers:   · Sit to stand: CGA from recliner and toilet with use of grab bar   · Stand to sit: CGA for safety to recliner and toilet with use of grab bar   · Step pivot: CGA for safety with RW   · Sitting balance:  SBA at toilet and edge of recliner while adjusting socks and managing renee care   · Standing balance:  CGA for safety at RW and while at sink performing hand hygiene. Jane while managing LB dressing without any support demonstrating posterior LOB. Cued for having 1 UE stabilized on RW or grab bar for increasing her overall stability. x ~4 minutes standing   · Gait: ~120ft with RW CGA for safety. Fair to slower pace, antalgic with left hip pain, intermittent unsteadiness on turns having difficulty with RW (more familiar using rollator vs RW). · Educated pt POC, role of PT, DME use, discharge. [facility] 6 Clicks Inpatient Mobility:  AM-PAC Inpatient Mobility Raw Score : 18    Safety: patient left in chair with alarm, call light within reach, RN notified, gait belt used. Assessment:  Pt is a 8 year old female admitted with rectal bleeding. Recommend home with initial 24/7 supervision and [facility] PT once medically stable. At baseline she is Lydia with gross mobility and ADLs with assist from son for IADLs. She performed well this date though slightly below her baseline. She would benefit from continued therapy to address her current deficits, dec potential fall risk, and restore function. Complexity: Moderate  Prognosis: Good, no significant barriers to participation at this time.    Plan Times per week: 4+/week, 1 week  Discharge Recommendations: 24 hour supervision or assist, Home with Home health PT  Equipment: no needs     Goals:  Short term goals  Time Frame for Short term goals: 1 week  Short term goal 1: Pt will perform bed mobility Lydia  Short term goal 2: Pt will transfer to all surfaces Lydia  Short term goal 3: Pt will ambulate 150ft with RW Lydia  Short term goal 4: Pt will ascend/descend 1 step, single rail SBA       Treatment plan:  Bed mobility, transfers, balance, gait, TA, TX, stairs     Recommendations for NURSING mobility: ambulate daily with RW and gait belt     Time:   Time in: 1155  Time out: 1231  Timed treatment minutes: 24  Total time: 36    Electronically signed by:    Rocky Tobar, [id number]  3/15/2021, 4:18 PM

## 2021-03-15 NOTE — CONSULTS
[facility] Gastroenterology  Gastroenterology Consultation    3/15/2021  8:30 AM    Patient:    Berny Wick  : 1920   89 y.o. MRN: [id number]  Admitted: 3/14/2021  9:39 AM ATT: García Rain MD   [id number]  AdmitDx: Rectal bleeding [K62.5]  GI bleed [K92.2]  PCP: Luz Mackenzie MD    Reason for Consult: Lower GI bleed     Requesting Physician:  García Rain MD      History Obtained From:  Patient and review of all records    HISTORY OF PRESENT ILLNESS:                The patient is a 80 y.o. female with significant past medical history as below who presents with above mentioned causes in reason for consult. Presented to [facility] yesterday after waking up in am with incontinence stool with blood. Patient blind. No hx of rectal bleeding. Denies Abdominal pain  Denies N/V, GERD, dyspepsia, dysphagia   Last BM this am dark maroon blood with clot     Denies History of EGD  History of colonoscopy ~30 yrs ago    ASA 81 mg daily  Denies NSAIDs  Denies Anti-platelet therapy    NonSmoker  Denies Alcohol intake    Past Medical History:        Diagnosis Date    Back pain     Hyperlipidemia     Hypertension     Hypothyroidism        Past Surgical History:        Procedure Laterality Date    HYSTERECTOMY           Current Medications:    Medications    Scheduled Medications:    metoprolol (LOPRESSOR) ivpb  2.5 mg Intravenous Q6H    vitamin C  1,000 mg Oral Daily    dilTIAZem  240 mg Oral Daily    levothyroxine  75 mcg Oral Daily    gabapentin  300 mg Oral Nightly    sodium chloride flush  10 mL Intravenous 2 times per day    pantoprazole  40 mg Intravenous BID     PRN Medications: sodium chloride flush, promethazine **OR** ondansetron, acetaminophen **OR** acetaminophen      Allergies:  Patient has no known allergies. Social History:   TOBACCO:   reports that she has never smoked.  She has never used smokeless tobacco.  ETOH:   reports no history of alcohol use. Family History:   History reviewed. No pertinent family history. No family history of colon cancer, Crohn's disease, or ulcerative colitis. REVIEW OF SYSTEMS:    The positive ROS will be identified in bold, otherwise ROS are negative     CONSTITUTIONAL:  Neg   Recent weight changes, fatigue, fever, chills or night sweats  MOUTH/THROAT:  Neg  bleeding gums, hoarseness or sore throat. RESPIRATORY:   Neg SOB, wheeze, cough, sputum, hemoptysis or bronchitis  CARDIOVASCULAR:  Neg chest pain, palpitations, dyspnea on exertion, orthopnea, paroxysmal nocturnal dyspnea or edema  GASTROINTESTINAL:  SEE HPI  HEMATOLOGIC/LYMPHATIC:  Neg  Anemia, bleeding tendency  MUSCULOSKELETAL:    New myalgias, bone pain, joint pain, swelling or stiffness and has had change in gait. NEUROLOGICAL:  Neg  Loss of Consciousness, memory loss, forgetfulness, periods of confusion, difficulty concentrating, seizures, decline in intellect, nervousness, insomina, aphasia or dysarthria. SKIN:  Neg  skin or hair changes, and has no itching, rashes, or sores. PSYCHIATRIC:  Neg depression, personality changes, anxiety. ENDOCRINE:  Neg polydipsia, polyuria, abnormal weight changes, heat /cold intolerance.   ALL/IMM:  Neg reactions to drugs other than listed    PHYSICAL EXAM:      Vitals:    BP (!) 179/80   Pulse 77   Temp 98.4 °F (36.9 °C) (Oral)   Resp 16   Ht 4' 9\" (1.448 m)   Wt 132 lb 6.4 oz (60.1 kg)   SpO2 90%   BMI 28.65 kg/m²     General Appearance:    Alert, cooperative, no distress, appears stated age   HEENT:    Normocephalic, atraumatic, Conjunctiva clear   Neck:   Supple, symmetrical, trachea midline   Lungs:     Clear to auscultation bilaterally, respirations unlabored   Chest Wall:    No tenderness or deformity    Heart:    Regular rate and rhythm, S1 and S2 normal   Abdomen:     Soft, non-tender, bowel sounds active all four quadrants,     no masses, no organomegaly, no ascites    Rectal:    External hemorrhoids. Internal small hemorrhoid, no mass, blood on glove, maroon   Extremities:   Extremities normal, atraumatic, no cyanosis or edema   Pulses:   2+ and symmetric all extremities   Skin:   Skin color, texture, turgor normal, no rashes or lesions       Neurologic:   No focal deficits, moving all four extremities            DATA:    ABGs: No results found for: PHART, PO2ART, XMD4ULM  CBC:   Recent Labs     03/14/21  0945 03/14/21  2106 03/15/21  0135 03/15/21  0630   WBC 7.7  --   --   --    HGB 12.5 11.6* 10.3* 11.0*     --   --   --      BMP:    Recent Labs     03/14/21  0945      K 3.9      CO2 26   BUN 26*   CREATININE 0.9   GLUCOSE 110*     Magnesium: No results found for: MG  Hepatic:   Recent Labs     03/14/21  0945   AST 21   ALT 17   BILITOT 0.4   ALKPHOS 50     No results for input(s): LIPASE, AMYLASE in the last 72 hours. Recent Labs     03/14/21  0945   PROTIME 12.5   INR 1.03     No results for input(s): PTT in the last 72 hours. Lipids: No results for input(s): CHOL, HDL in the last 72 hours. Invalid input(s): LDLCALCU  INR:   Recent Labs     03/14/21  0945   INR 1.03     TSH: No results found for: TSH  No intake or output data in the 24 hours ending 03/15/21 0830      Imaging Studies:reviewed      IMPRESSION:      Patient Active Problem List   Diagnosis Code    GI bleed K92.2       ASSESSMENT:  Lower GI bleed   Hgb 11.0  May be diverticular  May be avm   May be hemorrhoids  CT 3/14/21  [facility]   prominent arterial vessels in the anterior wall of the rectum and anus. RECOMMENDATIONS:  H&H Q 12 Hoours  Start Anusol supp BID  Clear liquid diet   PT/OT, ambulate to chair     D/W Patient and patients son, Jenn Mixon. Risks and benefits of colonoscopy. At this time they BOTH want to hold off on c scope, treat symptomatically, If hgb drops sig lower where she needs blood then will consider c scope.      Discussed plan of care with patient, RN, and family     Patient clinical, biochemical, and radiological information discussed with Dr. Lizbet Ragsdale. He agrees with the assessment and plan. Shaunna Campos CNP  3/15/2021  8:30 AM      Still some BRBPR  Will follow  C scope if needed    I have seen and examined this patient personally, and independently of the nurse practitioner. The plan was developed mutually at the time of the visit with the patient. Shaunna Campos and myself have spoken with patient, nursing staff and provided written and verbal instructions .     The above note has been reviewed and I agree with the Assessment,  Diagnosis, and Treatment plan as suggested by Shaunna Campos CNP      [address] [facility] gastroenterology

## 2021-03-15 NOTE — PROGRESS NOTES
Occupational Therapy  [facility] OCCUPATIONAL THERAPY EVALUATION    History  [place]:  The encounter diagnosis was Rectal bleeding. Restrictions:                           Communication with other providers: RN, PT    Subjective:  Patient states:  \"you just got to keep moving\"  Pain:  4/10 L leg  Patient goal:  home    Occupational profile (relevant social history and personal factors):    Social/Functional History  Lives With: Alone  Type of Home: House  Home Layout: One level  Home Access: Stairs to enter without rails  Entrance Stairs - Number of Steps: 1  Bathroom Shower/Tub: (has been sponge bathing)  Bathroom Toilet: Standard  Home Equipment: 4 wheeled walker  ADL Assistance: Independent  Homemaking Assistance: (son manages meds. Pt can typically light meal prep and do he own laundry. Reports her son is very helpful when she needs him)  Ambulation Assistance: Independent  Transfer Assistance: Independent  Active : No  Patient's  Info: son  Occupation: Retired  Type of occupation: banker    Examination of body systems (includes body structures/functions, activity/participation limitations):  · Orientation: normal  · Cognition:  WFL   · Observation:  Received pt in chair. Alert, pleasant. · Vision:  Reduced acuiy related to degenerative changes. Wears glasses  · Hearing:  [place], hearing aides  · ROM:  WFL BUE  · Strength: WFL BUE  · Sensation: WFL BUE    ADLs  Feeding: BRANDAN    Grooming: pt washed hands and brushed teetch standing sinkside with 1UE supported intermittently to steady self + CGA as a precaution due to intermittent unsteadiness    Dressing: UB SBA  in seated LB CGA, manages pants threading while seated on commode    Bathing: UB SBA in seated LB CGA    Toileting: SBA, manages pericare while seated    *Some ADL determined per observation of actual ADL performance, functional mobility, balance, activity tolerance, and cognition.      AM-PAC 6 click short form for inpatient daily activity:  Raw Score: 19  24/24 = unimpaired  23/24 = 1-20% impaired   20/24-22/24 = 21-40% impaired  15/24-19/24 = 41-59% impaired   10/24-14/24 = 60%-79% impaired  7/24-9/24 = 80%-99% impaired  6/24 = 100% impaired    Functional Mobility  Bed mobility: not tested    Sitting balance: good    Transfers:   Sit to stand: CGA   Stand to sit: CGA  *at low commode and chair, cues for safer hand placement    Standing balance: CGA during ADL 0-1 UE supported    Ambulation:  CGA c '. Unsteady intermittently exacerbated by low vision in an unfamiliar environment and unfamiliar environment     Activity tolerance  WFL , near baseline. Assessment:  Assessment  Prognosis: Good  Decision Making: Medium Complexity  REQUIRES OT FOLLOW UP: Yes  Discharge Recommendations: 24 hour supervision or assist, Home with Home health OT    Pt is a 8 year old F admitted from home c suspected GI bleed vs hemorrhoids. Pt lives alone at baseline but has supportive son and neighbors whoe check-in daily and assist with groceries and transportation. Pt performs ADL, household mobility, and light chores/meal prep c modified independence. She is now functioning only slightly below baseline. Would benefit from OT during LOS and at d/c. Son and neighbors will assist her as needed as they have been doing before admission. Goals:  By d/c or goals met:     Pt will perform all bed mobility with BRANDAN in prep for EOB/OOB activity. Pt will perform all functional transfers with BRANDAN and appropriate use of LRD to bed, toilet, chair in prep for increased functional independence. Pt will perform UB ADLs with BRANDAN to increase functional independence. Pt will perform LB ADLs with BRANDAN to increase functional independence. Pt will perform all aspects of toileting with BRANDAN to increase functional independence. Pt will participate in therex/therax c emphasis on strength, activity tolerance,  safety, BRANDAN tasks.     Plan:  Plan  Times per week: 3x+ Recommendation for activity with nursing staff:  ambulate to bathroom with RW for toileting  ambulate in halls with RW    Treatment today:    Self Care Training:   Self care training was performed today. Cues were given for safety, sequence, UE/LE placement, visual cues, and balance. Activities performed today included dressing, toileting, hand hygiene, and grooming. Education: Role of OT, OT POC, d/c needs, home safety    Safety: Left in chair with all needs in reach. chair alarm applied. Gait belt used for transfer and mobility. Time in:  1155  Time out:  1230  Timed treatment minutes:  23  Total treatment time:  35    Electronically signed by:    [id number] Rucker, OTR/L, [facility]   [id number]   2:55 PM, 3/15/2021

## 2021-03-15 NOTE — PROGRESS NOTES
Progress Note  Date:3/15/2021       Room:[address] [place] [address]-A  Patient Na Mullen     YOB: 1920     Age:89 y.o. Has no abd pain    Subjective    Subjective:  Symptoms:  Stable. Diet:  NPO. Pain:  She reports no pain. Review of Systems  Objective         Vitals Last 24 Hours:  TEMPERATURE:  Temp  Av °F (36.7 °C)  Min: 97.4 °F (36.3 °C)  Max: 98.4 °F (36.9 °C)  RESPIRATIONS RANGE: Resp  Av.3  Min: 15  Max: 26  PULSE OXIMETRY RANGE: SpO2  Av.7 %  Min: 90 %  Max: 98 %  PULSE RANGE: Pulse  Av.6  Min: 70  Max: 79  BLOOD PRESSURE RANGE: Systolic (09CZE), FK , Min:141 , GXQ:827   ; Diastolic (35XJM), GTV:19, Min:60, Max:91    I/O (24Hr): No intake or output data in the 24 hours ending 03/15/21 0820  Objective:  General Appearance:  Comfortable. Vital signs: (most recent): Blood pressure (!) 214/96, pulse 71, temperature 97.6 °F (36.4 °C), temperature source Oral, resp. rate 18, height 4' 9\" (1.448 m), weight 132 lb 6.4 oz (60.1 kg), SpO2 92 %. Vital signs are normal.    HEENT: Normal HEENT exam.    Heart: Normal rate. Abdomen: Abdomen is soft. Bowel sounds are normal.     Extremities: Decreased range of motion. Neurological: Patient is alert. Pupils:  Pupils are equal, round, and reactive to light. Skin:  Warm.       Labs/Imaging/Diagnostics    Labs:  CBC:  Recent Labs     21  0945 21  2106 03/15/21  0135 03/15/21  0630   WBC 7.7  --   --   --    RBC 3.87*  --   --   --    HGB 12.5 11.6* 10.3* 11.0*   HCT 39.6 35.5* 31.6* 34.4*   .3*  --   --   --    RDW 13.5  --   --   --      --   --   --      CHEMISTRIES:  Recent Labs     21  0945      K 3.9      CO2 26   BUN 26*   CREATININE 0.9   GLUCOSE 110*     PT/INR:  Recent Labs     21  0945   PROTIME 12.5   INR 1.03     APTT:  Recent Labs     21  0945   APTT 24.8*     LIVER PROFILE:  Recent Labs     21  0945   AST 21   ALT 17   BILITOT 0.4   ALKPHOS 50       Imaging Last 24 Hours:  Xr Chest Portable    Result Date: 3/14/2021  EXAMINATION: ONE XRAY VIEW OF THE CHEST 3/14/2021 10:38 am COMPARISON: 02/24/2009 HISTORY: Mild hypoxia. FINDINGS: Heart is not enlarged. Severe atherosclerotic calcification of the aortic arch. No acute airspace disease, pleural effusion, pneumothorax. Osteopenia. No acute abnormality. Cta Abdomen Pelvis W Contrast    Result Date: 3/14/2021  EXAMINATION: CTA OF THE ABDOMEN AND PELVIS WITH CONTRAST 3/14/2021 11:01 am TECHNIQUE: CTA of the abdomen and pelvis was performed with the administration of intravenous contrast. Multiplanar reformatted images are provided for review. MIP images are provided for review. Dose modulation, iterative reconstruction, and/or weight based adjustment of the mA/kV was utilized to reduce the radiation dose to as low as reasonably achievable. COMPARISON: None. HISTORY: ORDERING SYSTEM PROVIDED HISTORY: GIB protocol, abd pain, BRBPR TECHNOLOGIST PROVIDED HISTORY: Reason for exam:->GIB protocol, abd pain, BRBPR Decision Support Exception->Emergency Medical Condition (MA) Reason for Exam: RECTAL BLEEDING Acuity: Acute Type of Exam: Initial Relevant Medical/Surgical History: [facility]S [id number] [id number] FINDINGS: Nonvascular Lower Chest: Mild dependent atelectasis in the lower lobes. No acute infiltrate at the lung bases. Cardiomegaly is noted. Organs: The liver, spleen, pancreas and adrenal glands are unremarkable. The gallbladder is mildly distended. There is poorly defined dependent soft tissue attenuation in the gallbladder neck, likely sludge. No biliary dilatation. No renal mass or significant hydronephrosis. GI/Bowel: Colonic diverticulosis with no acute features. Mild retained stool throughout the colon. No small bowel distension. The stomach and duodenal sweep are intact. Pelvis: No pelvic mass or free pelvic fluid. The uterus is absent. Mild distention of the urinary bladder. Peritoneum/Retroperitoneum: There is no retroperitoneal adenopathy or upper abdominal ascites. Bones/Soft Tissues: No acute osseous or soft tissue abnormality. Osteopenia with multilevel degenerative disc disease throughout the lumbar spine. There is a levoscoliosis of the lumbar spine centered at L2. VASCULAR Moderate calcified atherosclerotic plaque throughout the distal thoracic aorta and abdominal aorta. 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 Plaque in the proximal celiac artery and SMA. There is a 50% to 70% stenosis of the SMA and no significant celiac artery stenosis. The BEBE is patent. Single renal artery supplying each kidney with proximal plaque. The plaque obscures the origins with no focal high-grade stenosis appreciated. Calcified atherosclerotic plaque in the common iliac arteries and proximal femoral circulation bilaterally. No significant inflow disease. Bilateral SFA stenoses. There are branch stenoses within the left profunda femoral circulation. The internal iliac arteries are patent bilaterally with a moderate stenosis at the origin of the left internal iliac artery. Evaluation for active arterial bleeding is limited by the lack of initial noncontrast imaging and delayed imaging. There is no evidence of active arterial bleeding. Mildly prominent arterial vessels are noted in the anterior wall of the rectum and anus. 1. No evidence of active arterial bleeding on arterial phase imaging. Mildly prominent arterial vessels in the anterior wall of the rectum and anus. 2. Moderately severe aortoiliac atherosclerotic disease. 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 No significant inflow disease. Bilateral proximal SFA stenoses.  3. 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 Heavy plaque in the proximal renal arteries limits evaluation. 4. Colonic diverticulosis with no acute features. Assessment//Plan           Hospital Problems           Last Modified POA    GI bleed 3/14/2021 Yes        Assessment & Plan  Anemia with GI bleed  -hold ASA  -CTA with no bleeding  -PPI  -GI eval  Hypothyroid  -synthroid  HTN(states has white coat HTN also)  -CCB and ARB but NPO currently  -IV lopressor prn  Mod PCM      Addendum  -discussed with GI and no procedures and monitor for now. If Hb stable for next 24hrs and no futher rectal bleed then anticipate discharge.    Electronically signed by Kellen Oliver MD on 3/15/21 at 8:20 AM EDT

## 2021-03-16 ENCOUNTER — ANESTHESIA EVENT (OUTPATIENT)
Dept: ENDOSCOPY | Age: 86
DRG: 378 | End: 2021-03-16
Payer: MEDICARE

## 2021-03-16 ENCOUNTER — ANESTHESIA (OUTPATIENT)
Dept: ENDOSCOPY | Age: 86
DRG: 378 | End: 2021-03-16
Payer: MEDICARE

## 2021-03-16 VITALS — SYSTOLIC BLOOD PRESSURE: 165 MMHG | DIASTOLIC BLOOD PRESSURE: 64 MMHG | OXYGEN SATURATION: 98 %

## 2021-03-16 LAB
HCT VFR BLD CALC: 32.9 % (ref 37–47)
HCT VFR BLD CALC: 33 % (ref 37–47)
HEMOGLOBIN: 10.5 GM/DL (ref 12.5–16)
HEMOGLOBIN: 10.6 GM/DL (ref 12.5–16)

## 2021-03-16 PROCEDURE — 6370000000 HC RX 637 (ALT 250 FOR IP): Performed by: INTERNAL MEDICINE

## 2021-03-16 PROCEDURE — 0DJD8ZZ INSPECTION OF LOWER INTESTINAL TRACT, VIA NATURAL OR ARTIFICIAL OPENING ENDOSCOPIC: ICD-10-PCS | Performed by: INTERNAL MEDICINE

## 2021-03-16 PROCEDURE — 97530 THERAPEUTIC ACTIVITIES: CPT

## 2021-03-16 PROCEDURE — 85018 HEMOGLOBIN: CPT

## 2021-03-16 PROCEDURE — 94761 N-INVAS EAR/PLS OXIMETRY MLT: CPT

## 2021-03-16 PROCEDURE — 3700000001 HC ADD 15 MINUTES (ANESTHESIA): Performed by: INTERNAL MEDICINE

## 2021-03-16 PROCEDURE — 3609008400 HC SIGMOIDOSCOPY DIAGNOSTIC: Performed by: INTERNAL MEDICINE

## 2021-03-16 PROCEDURE — 2709999900 HC NON-CHARGEABLE SUPPLY: Performed by: INTERNAL MEDICINE

## 2021-03-16 PROCEDURE — C9113 INJ PANTOPRAZOLE SODIUM, VIA: HCPCS | Performed by: INTERNAL MEDICINE

## 2021-03-16 PROCEDURE — 6360000002 HC RX W HCPCS: Performed by: NURSE ANESTHETIST, CERTIFIED REGISTERED

## 2021-03-16 PROCEDURE — 85014 HEMATOCRIT: CPT

## 2021-03-16 PROCEDURE — 2580000003 HC RX 258: Performed by: INTERNAL MEDICINE

## 2021-03-16 PROCEDURE — 6360000002 HC RX W HCPCS: Performed by: INTERNAL MEDICINE

## 2021-03-16 PROCEDURE — 2500000003 HC RX 250 WO HCPCS: Performed by: INTERNAL MEDICINE

## 2021-03-16 PROCEDURE — 36415 COLL VENOUS BLD VENIPUNCTURE: CPT

## 2021-03-16 PROCEDURE — 2500000003 HC RX 250 WO HCPCS: Performed by: NURSE ANESTHETIST, CERTIFIED REGISTERED

## 2021-03-16 PROCEDURE — 1200000000 HC SEMI PRIVATE

## 2021-03-16 PROCEDURE — 3700000000 HC ANESTHESIA ATTENDED CARE: Performed by: INTERNAL MEDICINE

## 2021-03-16 RX ORDER — PROPOFOL 10 MG/ML
INJECTION, EMULSION INTRAVENOUS PRN
Status: DISCONTINUED | OUTPATIENT
Start: 2021-03-16 | End: 2021-03-16 | Stop reason: SDUPTHER

## 2021-03-16 RX ORDER — LIDOCAINE HYDROCHLORIDE 20 MG/ML
INJECTION, SOLUTION EPIDURAL; INFILTRATION; INTRACAUDAL; PERINEURAL PRN
Status: DISCONTINUED | OUTPATIENT
Start: 2021-03-16 | End: 2021-03-16 | Stop reason: SDUPTHER

## 2021-03-16 RX ADMIN — PROPOFOL 90 MG: 10 INJECTION, EMULSION INTRAVENOUS at 08:16

## 2021-03-16 RX ADMIN — PANTOPRAZOLE SODIUM 40 MG: 40 INJECTION, POWDER, FOR SOLUTION INTRAVENOUS at 09:15

## 2021-03-16 RX ADMIN — HYDROCORTISONE ACETATE 25 MG: 25 SUPPOSITORY RECTAL at 09:14

## 2021-03-16 RX ADMIN — SODIUM CHLORIDE, PRESERVATIVE FREE 10 ML: 5 INJECTION INTRAVENOUS at 09:16

## 2021-03-16 RX ADMIN — PANTOPRAZOLE SODIUM 40 MG: 40 INJECTION, POWDER, FOR SOLUTION INTRAVENOUS at 20:35

## 2021-03-16 RX ADMIN — METOPROLOL TARTRATE 2.5 MG: 5 INJECTION INTRAVENOUS at 14:57

## 2021-03-16 RX ADMIN — MAGNESIUM OXIDE 400 MG (241.3 MG MAGNESIUM) TABLET 400 MG: TABLET at 09:15

## 2021-03-16 RX ADMIN — DILTIAZEM HYDROCHLORIDE 240 MG: 240 CAPSULE, COATED, EXTENDED RELEASE ORAL at 09:15

## 2021-03-16 RX ADMIN — OXYCODONE HYDROCHLORIDE AND ACETAMINOPHEN 1000 MG: 500 TABLET ORAL at 09:15

## 2021-03-16 RX ADMIN — SODIUM CHLORIDE, PRESERVATIVE FREE 10 ML: 5 INJECTION INTRAVENOUS at 20:47

## 2021-03-16 RX ADMIN — LOSARTAN POTASSIUM 100 MG: 100 TABLET, FILM COATED ORAL at 09:15

## 2021-03-16 RX ADMIN — METOPROLOL TARTRATE 2.5 MG: 5 INJECTION INTRAVENOUS at 20:35

## 2021-03-16 RX ADMIN — GABAPENTIN 300 MG: 300 CAPSULE ORAL at 20:35

## 2021-03-16 RX ADMIN — METOPROLOL TARTRATE 2.5 MG: 5 INJECTION INTRAVENOUS at 09:15

## 2021-03-16 RX ADMIN — LIDOCAINE HYDROCHLORIDE 60 MG: 20 INJECTION, SOLUTION EPIDURAL; INFILTRATION; INTRACAUDAL; PERINEURAL at 08:16

## 2021-03-16 ASSESSMENT — PAIN DESCRIPTION - PROGRESSION
CLINICAL_PROGRESSION: GRADUALLY WORSENING

## 2021-03-16 ASSESSMENT — PAIN SCALES - GENERAL
PAINLEVEL_OUTOF10: 0

## 2021-03-16 NOTE — ANESTHESIA POSTPROCEDURE EVALUATION
Department of Anesthesiology  Postprocedure Note    Patient: Sherwin Posadas  MRN: [id number]  YOB: 1920  Date of evaluation: 3/16/2021  Time:  8:27 AM     Procedure Summary     Date: 03/16/21 Room / Location: [address]    Anesthesia Start: 8997 Anesthesia Stop: 9919    Procedure: SIGMOIDOSCOPY DIAGNOSTIC FLEXIBLE (N/A ) Diagnosis: (RECTAL BLEED)    Surgeons: Joe Light MD Responsible Provider: Jaron Mojica DO    Anesthesia Type: MAC ASA Status: 3          Anesthesia Type: MAC    Viviana Phase I:      Vviiana Phase II:      Last vitals: Reviewed and per EMR flowsheets.        Anesthesia Post Evaluation    Patient location during evaluation: bedside  Patient participation: complete - patient participated  Level of consciousness: awake and alert  Pain score: 0  Airway patency: patent  Nausea & Vomiting: no nausea and no vomiting  Complications: no  Cardiovascular status: hemodynamically stable  Respiratory status: acceptable  Hydration status: euvolemic

## 2021-03-16 NOTE — PROGRESS NOTES
Hospitalist Progress Note      Name:  Charis Hui /Age/Sex: 1920  (80 y.o. female)   MRN & CSN:  [id number] & [id number] Admission Date/Time: 3/14/2021  9:39 AM   Location:  [address] [place] [address] PCP: Adela Lopez MD         Hospital Day: 3    Assessment and Plan:   Charis Hui is a 80 y.o.  female  who presents with bright red blood per rectum    · Lower GI Bleed, hemodynamically stable  · S/p colonoscopy on 2021  · Acute blood loss anemia  Serial H&H, stable  Transfuse for Hb less than 7  CT suggestive of diverticulosis  GI on board, colonoscopy today suggestive of diverticulosis  On IV pantoprazole, antiemetics as needed, symptomatic management  Diet advanced  Aspirin on hold    · Essential HTN, poor control  Resume home medications, hydralazine as needed, titrate as needed    Chronic medical conditions, occasions resumed unless otherwise contraindicated  Arthritis  Hypothyroidism    Son at bedside, discussed care and course, plans for discharge tomorrow, patient stays at home by herself, son worried, PT OT to give recommendations, anticipate discharge tomorrow if remains stable  Diet DIET DENTAL SOFT;   DVT Prophylaxis [] Lovenox, []  Heparin, [] SCDs, []No VTE prophylaxis, patient ambulating   GI Prophylaxis [] PPI, [] H2 Blocker, [] No GI prophylaxis, patient is receiving diet/Tube Feeds   Code Status DNR-CCA   Disposition Patient requires continued admission due to    MDM [] Low, [] Moderate,[]  High  Patient's risk as above due to      History of Present Illness:     Pt S&E. No complaints, seems comfortable, nausea or vomiting, no bleeds noticed  10-14 point ROS reviewed negative, unless as noted above    Objective:        Intake/Output Summary (Last 24 hours) at 3/16/2021 1413  Last data filed at 3/16/2021 1027  Gross per 24 hour   Intake 1260 ml   Output    Net 1260 ml      Vitals:   Vitals:    21 1221   BP: (!) 173/73   Pulse:    Resp:    Temp:    SpO2:      Physical Exam: GEN Awake female, sitting upright in bed in no apparent distress. Appears given age. EYES Pupils are equally round. No scleral erythema, discharge, or conjunctivitis. HENT Mucous membranes are moist.   NECK No apparent thyromegaly or masses. RESP Clear to auscultation, no wheezes, rales or rhonchi. Symmetric chest movement while on room air. CARDIO/VASC S1/S2 auscultated. Regular rate without appreciable murmurs, rubs, or gallops. Peripheral pulses equal bilaterally and palpable. No peripheral edema. GI Abdomen is soft without significant tenderness, masses, or guarding. Bowel sounds are normoactive. Rectal exam deferred.  Lundberg catheter is not present. HEME/LYMPH No petechiae or ecchymoses. MSK No gross joint deformities. Spontaneous movement of all extremities  SKIN Normal coloration, warm, dry. NEURO Cranial nerves appear grossly intact, normal speech, no lateralizing weakness. PSYCH Awake, alert, oriented x 4. Affect appropriate.     Medications:   Medications:    metoprolol (LOPRESSOR) ivpb  2.5 mg Intravenous Q6H    hydrocortisone  25 mg Rectal BID    losartan  100 mg Oral Daily    magnesium oxide  400 mg Oral Daily    vitamin C  1,000 mg Oral Daily    dilTIAZem  240 mg Oral Daily    levothyroxine  75 mcg Oral Daily    gabapentin  300 mg Oral Nightly    sodium chloride flush  10 mL Intravenous 2 times per day    pantoprazole  40 mg Intravenous BID      Infusions:   PRN Meds: hydrALAZINE (APRESOLINE) ivpb, 10 mg, Q6H PRN  traMADol, 25 mg, Q6H PRN  sodium chloride flush, 10 mL, PRN  promethazine, 12.5 mg, Q6H PRN    Or  ondansetron, 4 mg, Q6H PRN  acetaminophen, 650 mg, Q6H PRN    Or  acetaminophen, 650 mg, Q6H PRN        Data    Recent Labs     03/14/21  0945 03/14/21  0945 03/15/21  0630 03/16/21  0108 03/16/21  1240   WBC 7.7  --   --   --   --    HGB 12.5   < > 11.0* 10.5* 10.6*   HCT 39.6   < > 34.4* 33.0* 32.9*     --   --   --   --     < > = values in this interval not displayed. Recent Labs     03/14/21  0945      K 3.9      CO2 26   BUN 26*   CREATININE 0.9     Recent Labs     03/14/21  0945   AST 21   ALT 17   BILITOT 0.4   ALKPHOS 50     Recent Labs     03/14/21  0945   INR 1.03     No results for input(s): CKTOTAL, CKMB, CKMBINDEX, TROPONINI in the last 72 hours.         Electronically signed by Dayron Davey MD on 3/16/2021 at 2:13 PM

## 2021-03-16 NOTE — PROGRESS NOTES
Occupational Therapy  Attempted follow treatment this morning but pt requested bedrest after recently returning from colonoscopy. Will revisit afternoon.    [address], OTR/L, [facility]   [id number]   9:37 AM, 3/16/2021

## 2021-03-16 NOTE — PROGRESS NOTES
Patient finished Golytely sat on BSC having large liquid brown/red colored stools. No apparent fresh bleeding at this time. Patient also having small form to stool but mostly liquid at this time.

## 2021-03-16 NOTE — ANESTHESIA PRE PROCEDURE
Department of Anesthesiology  Preprocedure Note       Name:  Tom Delcid   Age:  80 y.o.  :  1920                                          MRN:  [id number]         Date:  3/16/2021      Surgeon: Azael Dsouza):  Raymundo Light MD    Procedure: Procedure(s):  COLONOSCOPY DIAGNOSTIC    Medications prior to admission:   Prior to Admission medications    Medication Sig Start Date End Date Taking? Authorizing Provider   dilTIAZem (DILACOR XR) 240 MG extended release capsule Take 240 mg by mouth daily   Yes Historical Provider, MD   telmisartan (MICARDIS) 80 MG tablet Take 80 mg by mouth daily   Yes Historical Provider, MD   gabapentin (NEURONTIN) 300 MG capsule Take 300 mg by mouth nightly.    Yes Historical Provider, MD   levothyroxine (SYNTHROID) 75 MCG tablet Take 75 mcg by mouth Daily   Yes Historical Provider, MD   aspirin 81 MG chewable tablet Take 81 mg by mouth daily   Yes Historical Provider, MD   magnesium gluconate (MAGONATE) 500 MG tablet Take 500 mg by mouth daily   Yes Historical Provider, MD   Ascorbic Acid (VITAMIN C) 250 MG tablet Take 1,000 mg by mouth daily   Yes Historical Provider, MD   Cholecalciferol (VITAMIN D) 50 MCG (2000) CAPS capsule Take 20,000 Units by mouth nightly   Yes Historical Provider, MD   Multiple Vitamins-Minerals (WOMENS DAILY FORMULA PO) Take 1 tablet by mouth daily   Yes Historical Provider, MD       Current medications:    Current Facility-Administered Medications   Medication Dose Route Frequency Provider Last Rate Last Admin    metoprolol (LOPRESSOR) 2.5 mg in sodium chloride 0.9 % 50 mL IVPB  2.5 mg Intravenous Bryant Henley MD   Stopped at 03/15/21 1546    hydrocortisone (ANUSOL-HC) suppository 25 mg  25 mg Rectal BID Seth Monday, APRN - CNP   25 mg at 03/15/21 1010    hydrALAZINE (APRESOLINE) 10 mg in sodium chloride 0.9 % 50 mL ivpb  10 mg Intravenous Q6H PRN Saintclair Fuller, MD   Stopped at 03/15/21 1219    losartan (COZAAR) tablet 100 mg  100 mg Oral Daily Princses Burrell MD        magnesium oxide (MAG-OX) tablet 400 mg  400 mg Oral Daily Princess Burrell MD        traMADol (ULTRAM) tablet 25 mg  25 mg Oral Q6H PRN Princess Burrell MD   25 mg at 03/15/21 1603    ascorbic acid (VITAMIN C) tablet 1,000 mg  1,000 mg Oral Daily Serafin Delaney MD   1,000 mg at 03/15/21 9073    dilTIAZem (CARDIZEM CD) extended release capsule 240 mg  240 mg Oral Daily Serafin Delaney MD   240 mg at 03/15/21 5606    levothyroxine (SYNTHROID) tablet 75 mcg  75 mcg Oral Daily Serafin Delaney MD   Stopped at 03/15/21 0700    gabapentin (NEURONTIN) capsule 300 mg  300 mg Oral Nightly Serafin Delaney MD   300 mg at 03/15/21 2109    sodium chloride flush 0.9 % injection 10 mL  10 mL Intravenous 2 times per day Serafin Delaney MD   10 mL at 03/15/21 2109    sodium chloride flush 0.9 % injection 10 mL  10 mL Intravenous PRN Serafin Delaney MD        promethazine (PHENERGAN) tablet 12.5 mg  12.5 mg Oral Q6H PRN Serafin Delaney MD        Or    ondansetron [facility]) injection 4 mg  4 mg Intravenous Q6H PRN Serafin Delaney MD   4 mg at 03/15/21 1853    acetaminophen (TYLENOL) tablet 650 mg  650 mg Oral Q6H PRN Serafin Delaney MD   650 mg at 03/14/21 2134    Or    acetaminophen (TYLENOL) suppository 650 mg  650 mg Rectal Q6H PRN Serafin Delaney MD        pantoprazole (PROTONIX) injection 40 mg  40 mg Intravenous BID Serafin Delaney MD   40 mg at 03/15/21 2109       Allergies:  No Known Allergies    Problem List:    Patient Active Problem List   Diagnosis Code    GI bleed K92.2       Past Medical History:        Diagnosis Date    Back pain     Hyperlipidemia     Hypertension     Hypothyroidism        Past Surgical History:        Procedure Laterality Date    HYSTERECTOMY         Social History:    Social History     Tobacco Use    Smoking status: Never Smoker    Smokeless tobacco: Never Used   Substance Use Topics    Alcohol use: Never     Frequency: Never Counseling given: Not Answered      Vital Signs (Current):   Vitals:    03/15/21 1233 03/15/21 1507 03/15/21 2100 03/16/21 0430   BP: 132/76 (!) 177/77 (!) 140/66 130/61   Pulse: 87 75 80 67   Resp:  20 16 16   Temp:  36.4 °C (97.5 °F) 37.1 °C (98.7 °F) 36.9 °C (98.4 °F)   TempSrc:  Oral Oral Oral   SpO2:  97% 94% 94%   Weight:       Height:                                                  BP Readings from Last 3 Encounters:   03/16/21 130/61       NPO Status: Time of last liquid consumption: 2200                        Time of last solid consumption: 1800                        Date of last liquid consumption: 03/15/21                        Date of last solid food consumption: 03/15/21    BMI:   Wt Readings from Last 3 Encounters:   03/14/21 132 lb 6.4 oz (60.1 kg)     Body mass index is 28.65 kg/m². CBC:   Lab Results   Component Value Date    WBC 7.7 03/14/2021    RBC 3.87 03/14/2021    HGB 10.5 03/16/2021    HCT 33.0 03/16/2021    .3 03/14/2021    RDW 13.5 03/14/2021     03/14/2021       CMP:   Lab Results   Component Value Date     03/14/2021    K 3.9 03/14/2021     03/14/2021    CO2 26 03/14/2021    BUN 26 03/14/2021    CREATININE 0.9 03/14/2021    GFRAA >60 03/14/2021    LABGLOM 58 03/14/2021    GLUCOSE 110 03/14/2021    PROT 5.8 03/14/2021    CALCIUM 8.4 03/14/2021    BILITOT 0.4 03/14/2021    ALKPHOS 50 03/14/2021    AST 21 03/14/2021    ALT 17 03/14/2021       POC Tests: No results for input(s): POCGLU, POCNA, POCK, POCCL, POCBUN, POCHEMO, POCHCT in the last 72 hours.     Coags:   Lab Results   Component Value Date    PROTIME 12.5 03/14/2021    INR 1.03 03/14/2021    APTT 24.8 03/14/2021       HCG (If Applicable): No results found for: PREGTESTUR, PREGSERUM, HCG, HCGQUANT     ABGs: No results found for: PHART, PO2ART, VAA5XER, OJD2MOQ, BEART, P0JWEWCD     Type & Screen (If Applicable):  No results found for: LABABO, LABRH    Drug/Infectious Status (If Applicable):  No results found for: HIV, HEPCAB    COVID-19 Screening (If Applicable):   Lab Results   Component Value Date    COVID19 NOT DETECTED 03/15/2021           Anesthesia Evaluation    Airway: Mallampati: II  TM distance: >3 FB     Mouth opening: > = 3 FB Dental: normal exam         Pulmonary:normal exam                               Cardiovascular:    (+) hypertension:,                   Neuro/Psych:               GI/Hepatic/Renal:             Endo/Other:    (+) hypothyroidism::., .                 Abdominal:           Vascular:                                        Anesthesia Plan      MAC     ASA 3       Induction: intravenous.                           JENNY Watkins CRNA   3/16/2021

## 2021-03-16 NOTE — PROGRESS NOTES
[address] [place] [address]        I have examined the patient within 24 hours  before the procedure and there is no change in the previous history and physical exam,which has been reviewed. There is no history of sleep apnea, snoring, or stridor. There has been no  previous adverse experience with sedation/anesthesia. There is no increased risk for aspiration of gastric contents. The patient has been instructed that all resuscitative measures (during the operative and immediate perioperative period) will be instituted in the unlikely event that they will be needed. ASA Class: 3  AIRWAY Class: 3     Consent form signed, witnessed and in soft chart           The patient was counseled at length about the risks of makenna Covid -!9 in the renee-operative and post -operative states including the recovery window of their procedure. The patient was made aware that makenna Covid -19 after an endoscopic procedure may worsen their prognosis for recovering from the virus and lend to a higher morbidity and mortality risk. The patient was given the options of postponing their procedure. I have reviewed with the patient and/or family the risks, benefits, and alternatives to the procedure. The patient wishes to proceed with the procedure.     Greta Collins MD  [facility] gastroenterology  3/16/2021  7:45 AM

## 2021-03-16 NOTE — PROGRESS NOTES
Occupational Therapy  Occupational Therapy Treatment Note    Date:  3/16/2021   Room:  [address] [place] [address] :   1920   MRN: [id number] Admission Date: 3/14/2021     Diagnosis:  The encounter diagnosis was Rectal bleeding. Restrictions/Precautions:                      Communication with other providers:  PT.    Subjective:  Patient states:  \"I am just so achy\"  Pain:   Location, Type, Intensity (0/10 to 10/10):  6/10     Objective:    Orientation: WFL   Observation: Pt received in bed. Alert and cooperative  Objective Measures:      Treatment, including education:  Therapeutic Activity Training:   Therapeutic activity training was instructed today. Cues were given for safety, sequence, UE/LE placement, visual cues, and balance. Activities performed today included bed mobility training, sup-sit, sit-stand, SPT. Bed mobility:  Supine to sit: SBA, inc time and effort, HOB elevated    Transfers: SBA stand-step sit to chair    Declined gait, declined toileting needs, declined desire to sponge bathing. Assessment / Impression:      Patient's tolerance of treatment:  WFL    Significant change in status and impact:  none  Barriers to improvement:  none  Recommendations:  at home    Plan for Next Session:    Cont POC addressing goals:    Goals:  By d/c or goals met:  Pt will perform all bed mobility with BRANDAN in prep for EOB/OOB activity. Pt will perform all functional transfers with BRANDAN and appropriate use of LRD to bed, toilet, chair in prep for increased functional independence. Pt will perform UB ADLs with BRANDAN to increase functional independence. Pt will perform LB ADLs with BRANDAN to increase functional independence. Pt will perform all aspects of toileting with BRANDAN to increase functional independence. Pt will participate in therex/therax c emphasis on strength, activity tolerance,  safety, BRANDAN tasks.     Safety: Left in chair  with all needs in reach. Chair  alarm applied. Gait belt used for transfer and mobility. Time in:  1048  Time out:  1058  Timed treatment minutes:  10  Total treatment time:  10    Electronically signed by:     [address], [place], [place]   [id number]   12:27 PM, 3/16/2021      Previously filed values:

## 2021-03-17 VITALS
RESPIRATION RATE: 16 BRPM | TEMPERATURE: 97.8 F | SYSTOLIC BLOOD PRESSURE: 140 MMHG | WEIGHT: 132.4 LBS | BODY MASS INDEX: 28.57 KG/M2 | OXYGEN SATURATION: 96 % | HEIGHT: 57 IN | DIASTOLIC BLOOD PRESSURE: 87 MMHG | HEART RATE: 76 BPM

## 2021-03-17 LAB
HCT VFR BLD CALC: 31 % (ref 37–47)
HCT VFR BLD CALC: 34.3 % (ref 37–47)
HEMOGLOBIN: 11.1 GM/DL (ref 12.5–16)
HEMOGLOBIN: 9.8 GM/DL (ref 12.5–16)

## 2021-03-17 PROCEDURE — 36415 COLL VENOUS BLD VENIPUNCTURE: CPT

## 2021-03-17 PROCEDURE — 94761 N-INVAS EAR/PLS OXIMETRY MLT: CPT

## 2021-03-17 PROCEDURE — 85018 HEMOGLOBIN: CPT

## 2021-03-17 PROCEDURE — 2580000003 HC RX 258: Performed by: INTERNAL MEDICINE

## 2021-03-17 PROCEDURE — 6370000000 HC RX 637 (ALT 250 FOR IP): Performed by: INTERNAL MEDICINE

## 2021-03-17 PROCEDURE — 2500000003 HC RX 250 WO HCPCS: Performed by: INTERNAL MEDICINE

## 2021-03-17 PROCEDURE — 85014 HEMATOCRIT: CPT

## 2021-03-17 RX ADMIN — MAGNESIUM OXIDE 400 MG (241.3 MG MAGNESIUM) TABLET 400 MG: TABLET at 09:55

## 2021-03-17 RX ADMIN — LEVOTHYROXINE SODIUM 75 MCG: 0.07 TABLET ORAL at 06:40

## 2021-03-17 RX ADMIN — HYDROCORTISONE ACETATE 25 MG: 25 SUPPOSITORY RECTAL at 09:55

## 2021-03-17 RX ADMIN — LOSARTAN POTASSIUM 100 MG: 100 TABLET, FILM COATED ORAL at 09:55

## 2021-03-17 RX ADMIN — SODIUM CHLORIDE, PRESERVATIVE FREE 10 ML: 5 INJECTION INTRAVENOUS at 10:05

## 2021-03-17 RX ADMIN — METOPROLOL TARTRATE 2.5 MG: 5 INJECTION INTRAVENOUS at 09:55

## 2021-03-17 RX ADMIN — OXYCODONE HYDROCHLORIDE AND ACETAMINOPHEN 1000 MG: 500 TABLET ORAL at 09:54

## 2021-03-17 RX ADMIN — DILTIAZEM HYDROCHLORIDE 240 MG: 240 CAPSULE, COATED, EXTENDED RELEASE ORAL at 09:55

## 2021-03-17 RX ADMIN — METOPROLOL TARTRATE 2.5 MG: 5 INJECTION INTRAVENOUS at 03:27

## 2021-03-17 ASSESSMENT — PAIN DESCRIPTION - PROGRESSION
CLINICAL_PROGRESSION: GRADUALLY WORSENING

## 2021-03-17 NOTE — CARE COORDINATION
CM into see pt and met with her son, Susie Nyhan. CM confirmed that pt would have someone with her 24/7. CM discussed home care and pt did not have a preference. Cm ps to [facility] with referral. CM collaborated with Dr Shanel Bolaños regarding discharge planning. [address]  [address] CM spoke with pts son regarding pt not be able to have home care due to Dr Antonio Shane not signing for home care orders. Son shared that he is going to take pt to his Dr and set up as a PCP. He has [address] information and will follow up with them when pt sees him. CM updated Dr Shanel Bolaños.  [address]

## 2021-03-17 NOTE — PROGRESS NOTES
[facility] Gastroenterology        Progress Note       3/17/2021  7:50 AM    Patient:    Kate Rojas  : 1920   89 y.o. MRN: [id number]  Admitted: 3/14/2021  9:39 AM ATT: Deshaun Vera MD   [id number]  AdmitDx: Rectal bleeding [K62.5]  GI bleed [K92.2]  PCP: Janeth Murray MD    SUBJECTIVE:  Chart reviewed, events noted  Patient feeling well. No complaints. Had small brown bm with one small spot of maroon blood per PCT. Sitting up in chair ate 100% of breakfast. Denies N/V or abd pain. \"Ready to go home\".     ROS:  The positive ROS will be identified in bold     CONSTITUTIONAL:  Neg  Weight loss, fatigue, fever  MOUTH/THROAT:  Neg  Bleeding gums, hoarseness or sore throat  RESPIRATORY:   Neg SOB, wheeze, cough, hemoptysis or bronchitis  CARDIOVASCULAR:  Neg Chest pain, palpitations, dyspnea on exertion, edema  GASTROINTESTINAL:  SEE HPI  HEMATOLOGIC/LYMPHATIC:  Neg  Anemia, bleeding tendency  MUSCULOSKELETAL: Neg  New myalgias, joint pain, swelling or stiffness  NEUROLOGICAL:  Neg  Loss of Consciousness, memory loss, forgetfulness, periods of confusion, difficulty concentrating, seizures, insomnia, aphasia    SKIN:  Neg No itching, rashes, or sores  PSYCHIATRIC:  Neg Depression, personality changes, anxiety    OBJECTIVE:      BP (!) 158/71   Pulse 71   Temp 98.2 °F (36.8 °C) (Oral)   Resp 16   Ht 4' 9\" (1.448 m)   Wt 132 lb 6.4 oz (60.1 kg)   SpO2 90%   BMI 28.65 kg/m²     NAD, appears comfortable in chair  Lips and mucous membranes pink and moist  RRR, Nl s1s2  Lungs CTA bilaterally, respirations even and unlabored   Abdomen soft, ND, NT, bowel sounds normal  Skin pink, warm and dry  No edema bilateral lower extremities   AAOx3, Habematolel, blind    CBC:   Recent Labs     21  0945 21  0945 21  0108 21  1240 21  0606   WBC 7.7  --   --   --   --    HGB 12.5   < > 10.5* 10.6* 9.8*     --   --   --   -- Follow H/H  D/w son    I have seen and examined this patient personally, and independently of the nurse practitioner. The plan was developed mutually at the time of the visit with the patient. Claudio Bar and myself have spoken with patient, nursing staff and provided written and verbal instructions .     The above note has been reviewed and I agree with the Assessment,  Diagnosis, and Treatment plan as suggested by Claudio Bar [address] gastroenterology

## 2021-03-17 NOTE — DISCHARGE INSTR - COC
Continuity of Care Form    Patient Name: Shannan Light   :  1920  MRN:  [id number]    Admit date:  3/14/2021  Discharge date:  ***    Code Status Order: DNR-CCA   Advance Directives:   [facility] Documentation     Date/Time Healthcare Directive Type of Healthcare Directive Copy in [address] Agent's Name Healthcare Agent's Phone Number    21 7845  Yes, patient has an advance directive for healthcare treatment  Durable power of  for health care;Living will  Yes, copy in chart  Adult Children  Jennifer Wood  [phone]    21 1437  Yes, patient has an advance directive for healthcare treatment    Yes, copy in chart  Adult Children  Man[place]low June          Admitting Physician:  Elicia Elena MD  PCP: Elder Baldwin MD    Discharging Nurse: [facility] Unit/Room#: 8174/3991-D  Discharging Unit Phone Number: ***    Emergency Contact:   Extended Emergency Contact Information  Primary Emergency Contact: Enid Rose Phone: [phone]  Relation: Child    Past Surgical History:  Past Surgical History:   Procedure Laterality Date    HYSTERECTOMY      SIGMOIDOSCOPY N/A 3/16/2021    SIGMOIDOSCOPY DIAGNOSTIC FLEXIBLE performed by Myron Magdaleno MD at [facility] ENDOSCOPY       Immunization History: There is no immunization history on file for this patient.     Active Problems:  Patient Active Problem List   Diagnosis Code    GI bleed K92.2       Isolation/Infection:   Isolation          No Isolation        Patient Infection Status     None to display          Nurse Assessment:  Last Vital Signs: BP (!) 140/87   Pulse 76   Temp 97.8 °F (36.6 °C) (Oral)   Resp 16   Ht 4' 9\" (1.448 m)   Wt 132 lb 6.4 oz (60.1 kg)   SpO2 96%   BMI 28.65 kg/m²     Last documented pain score (0-10 scale): Pain Level: (Sleeping)  Last Weight:   Wt Readings from Last 1 Encounters:   21 132 lb 6.4 oz (60.1 kg)     Mental Status:  {IP PT MENTAL STATUS:}    IV Access:  { BONNIE IV ACCESS:147018828}    Nursing Mobility/ADLs:  Walking   {P DME GLQQ:911845480}  Transfer  {P DME SUJW:374701790}  Bathing  {CHP DME IFGK:375635646}  Dressing  {CHP DME OYZB:388273535}  Toileting  {CHP DME URKS:518142048}  Feeding  {P DME KMRC:514592699}  Med Admin  {P DME AUYN:764127698}  Med Delivery   { BONNIE MED Delivery:778259358}    Wound Care Documentation and Therapy:        Elimination:  Continence:   · Bowel: {YES / VV:90555}  · Bladder: {YES / VC:62206}  Urinary Catheter: {Urinary Catheter:631623684}   Colostomy/Ileostomy/Ileal Conduit: {YES / KT:66076}       Date of Last BM: ***    Intake/Output Summary (Last 24 hours) at 3/17/2021 1323  Last data filed at 3/16/2021 1815  Gross per 24 hour   Intake 250 ml   Output    Net 250 ml     I/O last 3 completed shifts:   In: 26 [P.O.:550]  Out: -     Safety Concerns:     [address] Safety Concerns:087381840}    Impairments/Disabilities:      [address] Impairments/Disabilities:253379230}    Nutrition Therapy:  Current Nutrition Therapy:   [address] Diet List:797870250}    Routes of Feeding: {[facility] DME Other Feedings:543156154}  Liquids: {Slp liquid thickness:72842}  Daily Fluid Restriction: {P DME Yes amt example:897810377}  Last Modified Barium Swallow with Video (Video Swallowing Test): {Done Not Done LYBF:073632082}    Treatments at the Time of Hospital Discharge:   Respiratory Treatments: ***  Oxygen Therapy:  {Therapy; copd oxygen:65072}  Ventilator:    { CC Vent MGUE:425675063}    Rehab Therapies: {THERAPEUTIC INTERVENTION:9399171771}  Weight Bearing Status/Restrictions: [employer] Weight Bearin}  Other Medical Equipment (for information only, NOT a DME order):  {EQUIPMENT:062053601}  Other Treatments: ***    Patient's personal belongings (please select all that are sent with patient):  {FABRIZIO DME Belongings:145092971}    RN SIGNATURE:  {Esignature:063477094}    CASE MANAGEMENT/SOCIAL WORK SECTION    Inpatient Status Date: ***    Readmission Risk Assessment Score:  Readmission Risk              Risk of Unplanned Readmission:        10           Discharging to Facility/ Agency   · Name:   · Address:  · Phone:  · Fax:    Dialysis Facility (if applicable)   · Name:  · Address:  · Dialysis Schedule:  · Phone:  · Fax:    / signature: {Esignature:172071029}    PHYSICIAN SECTION    Prognosis: {Prognosis:2933872232}    Condition at Discharge: [address] [place] [address] Patient Condition:399918961}    Rehab Potential (if transferring to Rehab): {Prognosis:2627019278}    Recommended Labs or Other Treatments After Discharge: ***    Physician Certification: I certify the above information and transfer of Shreya Mosher  is necessary for the continuing treatment of the diagnosis listed and that she requires {Admit to Appropriate Level of Care:01821} for {GREATER/LESS:984954525} 30 days.      Update Admission H&P: {CHP DME Changes in UFOUL:477039359}    PHYSICIAN SIGNATURE:  {Esignature:175983793}

## 2021-03-17 NOTE — DISCHARGE SUMMARY
Discharge Summary    Name:  Mary Blackmon /Age/Sex: 1920  (Chiki Barbara [age] y.o. female)   MRN & CSN:  [id number] & [id number] Admission Date/Time: 3/14/2021  9:39 AM   Attending:  Tatiana Llanes MD Discharging Physician: Tatiana Llanes MD         Hospital Course:     Mary Blackmon is a Chiki Barbara [age] y.o.  female  who presents with bright red blood per rectum     · Lower GI Bleed, hemodynamically stable  · S/p colonoscopy on 2021  · Acute blood loss anemia  Serial H&H, stable  Transfuse for Hb less than 7  CT suggestive of diverticulosis  GI on board, colonoscopy today suggestive of diverticulosis  On IV pantoprazole, antiemetics as needed, symptomatic management  Diet advanced  Aspirin on hold, resumed on discharge  GI cleared for discharge, follow-up as outpatient     · Essential HTN, poor control, improved  Resume home medications, hydralazine as needed, titrate as needed     Chronic medical conditions, occasions resumed unless otherwise contraindicated  Arthritis  Hypothyroidism     Son at bedside, discussed care and course, patient stays at home by herself, son notes he is making arrangements for home health care as patient's PCP is no longer accepting. Patient refuses SNF and long-term. The patient expressed appropriate understanding of and agreement with the discharge recommendations, medications, and plan.      Consults this admission:  IP CONSULT TO CASE MANAGEMENT  IP CONSULT TO GI  IP CONSULT TO HOSPITALIST  IP CONSULT TO Anahy [address]    Discharge Instruction:   Follow up appointments: GI  Primary care physician:  within 2 weeks    Diet:  regular diet   Activity: activity as tolerated  Disposition: Discharged to:   []Home, [x][facility], []SNF, []Acute Rehab, []Hospice   Condition on discharge: Stable    Discharge Medications:      [facility] [place]   Home Medication Instructions [id number]    Printed on:21 4604   Medication Information                      Ascorbic Acid (VITAMIN C) 250 MG tablet  Take 1,000 mg by mouth daily             aspirin 81 MG chewable tablet  Take 81 mg by mouth daily             Cholecalciferol (VITAMIN D) 50 MCG (2000 UT) CAPS capsule  Take 20,000 Units by mouth nightly             dilTIAZem (DILACOR XR) 240 MG extended release capsule  Take 240 mg by mouth daily             gabapentin (NEURONTIN) 300 MG capsule  Take 300 mg by mouth nightly. levothyroxine (SYNTHROID) 75 MCG tablet  Take 75 mcg by mouth Daily             magnesium gluconate (MAGONATE) 500 MG tablet  Take 500 mg by mouth daily             Multiple Vitamins-Minerals (WOMENS DAILY FORMULA PO)  Take 1 tablet by mouth daily             telmisartan (MICARDIS) 80 MG tablet  Take 80 mg by mouth daily                 Objective Findings at Discharge:   BP (!) 140/87   Pulse 76   Temp 97.8 °F (36.6 °C) (Oral)   Resp 16   Ht 4' 9\" (1.448 m)   Wt 132 lb 6.4 oz (60.1 kg)   SpO2 96%   BMI 28.65 kg/m²            PHYSICAL EXAM   GEN Awake female, sitting upright in bed in no apparent distress. Appears given age. EYES Pupils are equally round. No scleral erythema, discharge, or conjunctivitis. HENT Mucous membranes are moist.   NECK No apparent thyromegaly or masses. RESP Clear to auscultation, no wheezes, rales or rhonchi. Symmetric chest movement while on room air. CARDIO/VASC S1/S2 auscultated. Regular rate without appreciable murmurs, rubs, or gallops. Peripheral pulses equal bilaterally and palpable. No peripheral edema. GI Abdomen is soft without significant tenderness, masses, or guarding. Bowel sounds are normoactive. Rectal exam deferred.  Lundberg catheter is not present. HEME/LYMPH No petechiae or ecchymoses. MSK No gross joint deformities. Spontaneous movement of all extremities  SKIN Normal coloration, warm, dry. NEURO Cranial nerves appear grossly intact, normal speech, no lateralizing weakness. PSYCH Awake, alert, oriented x 2-3. Affect appropriate.       BMP/CBC  Recent Labs     03/16/21 1240 03/17/21  0606 03/17/21  1245   HCT 32.9* 31.0* 34.3*       IMAGING:  Xr Chest Portable    Result Date: 3/14/2021  EXAMINATION: ONE XRAY VIEW OF THE CHEST 3/14/2021 10:38 am COMPARISON: 02/24/2009 HISTORY: Mild hypoxia. FINDINGS: Heart is not enlarged. Severe atherosclerotic calcification of the aortic arch. No acute airspace disease, pleural effusion, pneumothorax. Osteopenia. No acute abnormality. Cta Abdomen Pelvis W Contrast    Result Date: 3/14/2021  EXAMINATION: CTA OF THE ABDOMEN AND PELVIS WITH CONTRAST 3/14/2021 11:01 am TECHNIQUE: CTA of the abdomen and pelvis was performed with the administration of intravenous contrast. Multiplanar reformatted images are provided for review. MIP images are provided for review. Dose modulation, iterative reconstruction, and/or weight based adjustment of the mA/kV was utilized to reduce the radiation dose to as low as reasonably achievable. COMPARISON: None. HISTORY: ORDERING SYSTEM PROVIDED HISTORY: GIB protocol, abd pain, BRBPR TECHNOLOGIST PROVIDED HISTORY: Reason for exam:->GIB protocol, abd pain, BRBPR Decision Support Exception->Emergency Medical Condition (MA) Reason for Exam: RECTAL BLEEDING Acuity: Acute Type of Exam: Initial Relevant Medical/Surgical History: 85CC'S LSAUOB 130 FINDINGS: Nonvascular Lower Chest: Mild dependent atelectasis in the lower lobes. No acute infiltrate at the lung bases. Cardiomegaly is noted. Organs: The liver, spleen, pancreas and adrenal glands are unremarkable. The gallbladder is mildly distended. There is poorly defined dependent soft tissue attenuation in the gallbladder neck, likely sludge. No biliary dilatation. No renal mass or significant hydronephrosis. GI/Bowel: Colonic diverticulosis with no acute features. Mild retained stool throughout the colon. No small bowel distension. The stomach and duodenal sweep are intact. Pelvis: No pelvic mass or free pelvic fluid. The uterus is absent.   Mild distention stenoses. 3. 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 Heavy plaque in the proximal renal arteries limits evaluation. 4. Colonic diverticulosis with no acute features. Vl Dup Lower Extremity Venous Left    Result Date: 3/15/2021  EXAMINATION: DUPLEX VENOUS ULTRASOUND OF THE LEFT LOWER EXTREMITY 3/15/2021 8:30 pm TECHNIQUE: Duplex ultrasound using B-mode/gray scaled imaging and Doppler spectral analysis and color flow was obtained of the left lower extremity. COMPARISON: None. HISTORY: ORDERING SYSTEM PROVIDED HISTORY: leg pain TECHNOLOGIST PROVIDED HISTORY: Reason for exam:->leg pain Reason for Exam: lt leg pain Acuity: Acute Type of Exam: Initial FINDINGS: The visualized veins of the left lower extremity are patent and free of echogenic thrombus. The veins demonstrate good compressibility with normal color flow study and spectral analysis. No evidence of DVT in the left lower extremity.      Discharge Time of 32 minutes    Electronically signed by Robert Nunez MD on 3/17/2021 at 1:37 PM

## 2021-03-17 NOTE — PROGRESS NOTES
[address] Liaison spoke with pts son Paige Garner and he is agreeable to hhc at discharge.  Please place inpatient consult to home health needs [facility]. [facility] per Rebecca Pac need a following MD as Dr Tricia Gill is no longer signing c orders. Referral cancelled and liasion left msg for Kirti Tellez CM and spoke Paige Garner pt's son who stated he is getting another pcp for his mom in April.

## 2021-03-18 ENCOUNTER — CARE COORDINATION (OUTPATIENT)
Dept: CASE MANAGEMENT | Age: 86
End: 2021-03-18

## 2021-03-18 LAB
EKG ATRIAL RATE: 75 BPM
EKG DIAGNOSIS: NORMAL
EKG P AXIS: 19 DEGREES
EKG P-R INTERVAL: 202 MS
EKG Q-T INTERVAL: 362 MS
EKG QRS DURATION: 82 MS
EKG QTC CALCULATION (BAZETT): 404 MS
EKG R AXIS: -16 DEGREES
EKG T AXIS: 15 DEGREES
EKG VENTRICULAR RATE: 75 BPM

## 2021-03-18 NOTE — CARE COORDINATION
Faheem [facility] Transitions Initial Follow Up Call    Call within 2 business days of discharge: Yes    Patient: Rubi Byrd Patient : 1920   MRN: [id number]  Reason for Admission: GIB  Discharge Date: 3/17/21 RARS: Readmission Risk Score: 10  Facility: [facility]    Last Discharge [address] [address]       Complaint Diagnosis Description Type Department Provider    3/14/21 Rectal Bleeding; Leg Pain Rectal bleeding ED to Hosp-Admission (Discharged) (ADMITTED) Asif Youssef MD; Jacky Hummel MD... Non-face-to-face services provided:  Obtained and reviewed discharge summary and/or continuity of care documents  Education of patient/family/caregiver/guardian to support self-management-See below  [facility]    Care Transitions 24 Hour Call    Schedule Follow Up Appointment with PCP: Declined  Do you have any ongoing symptoms?: Yes  Patient-reported symptoms: Pain  Interventions for patient-reported symptoms: Other  Do you have a copy of your discharge instructions?: Yes  Do you have all of your prescriptions and are they filled?: Yes  Have you been contacted by a [facility] Pharmacist?: No  Have you scheduled your follow up appointment?: Yes  How are you going to get to your appointment?: Car - family or friend to transport  Were you discharged with any Home Care or Post Acute Services: No  Care Transitions Interventions     DME Assistance: Declined        Follow Up  No future appointments. Challenges to be reviewed by the provider   Additional needs identified to be addressed with provider : No PCP, see below       Method of communication with provider : call to office    [address] [place] [address] Drive: 3/15/21 Negative  PATIENT RISK FACTORS: Age  RARS: 8  [facility] PCP: No--see below    Was this a readmission?  No  Patient stated reason for admission: Rectal bleeding  Patients top risk factors for readmission: medical condition, medication management, utilization of services, caregiver stress and lost PCP while hospitalized-- no MD to sign hhc orders     [address] T/[address] Dr Aramis Laura office. Informed Dr Tricia Gill will not be signing hhc orders nor practicing x 8 wks plus. No MD covering practice. Advised to have Patient obtain new PCP. [address] w/ Ramos Luu Son/POA for discharge call as Patient resting. Reports Patient doing well since home other than chronic generalized pain, fatigue which start this past January. Reports Patient was on Neurontin, began having dizziness and increased falls last Nov. Reports last fall 1/29/21, Neurontin discontinued, no further falls. Reports Patient + bm x 2 since discharge, no blood noted. Reports b&b wnl, no tarry stools. No abd pain/cramping, bleeding, dizziness, sob or weakness noted. Tolerating diet. Confirmed copy of AVS received, reviewed. No new rx upon discharge. Reports need for Diltiazem refill and hhc however PCP abruptly not signing hhc orders nor practicing. Reports he his PCP, Dr Mi Benavides ([facility]), is working to get Patient established at office asap however will be out of Diltiazem in 8 days, no refills. Advised WIC to bridge healthcare, rx needs until established w/ Dr Mi Benavides. Politely declined CTN offer to schedule appt, citing he needed to arrange appt around family member. Son took [facility] contact information and very grateful, will call today for appt. Patient is 89yo, lives alone. Son and family members assuring 24/7 support for Patient until hhc can be arranged. Discussed benefits of private pay HHA, [facility], [facility]. Patient wishes to stay home. Son has call into [facility] and has list of private pay HHA agencies he is calling. Politely denies need for assistance.  Son to provide transportation for the below appts:    Dr Bethanie Lama 3/26/21 1120am  Dr Jennifer Posada [facility] 3/24/21 11am    Advance Care Planning   Healthcare Decision Maker:    Primary Decision Maker (Active): Felipa Walter - Child - [phone]  Has advanced directives; encouraged to place on file once new PCP is established. Reviewed the following, verbalizes understanding, denies Covid19 sx :  From CDC: Are you at higher risk for severe illness?  Wash your hands often.  Avoid close contact (6 feet, which is about two arm lengths) with people who are sick.  Put distance between yourself and other people if COVID-19 is spreading in your community.  Clean and disinfect frequently touched surfaces.  Wear mask covering nose and mouth when unable to social distance   Call your healthcare professional if you have concerns about COVID-19 and your underlying condition or if you are sick. No email for [facility]. Denies need for ongoing CT follow up. Advised to contact Dr Ayan Ceballos office at [phone] and [facility] HIM Dept [phone] for transfer of records to new PCP. Support given and CTN contact information. Encouraged to call with any resource needs.      Nimco Collins RN

## 2021-03-22 ENCOUNTER — HOSPITAL ENCOUNTER (OUTPATIENT)
Age: 86
Discharge: HOME | End: 2021-03-22
Payer: MEDICARE

## 2021-03-22 LAB
BASOPHILS ABSOLUTE: 0.1 K/CU MM
BASOPHILS RELATIVE PERCENT: 1.1 % (ref 0–1)
DIFFERENTIAL TYPE: ABNORMAL
EOSINOPHILS ABSOLUTE: 0.1 K/CU MM
EOSINOPHILS RELATIVE PERCENT: 1.9 % (ref 0–3)
HCT VFR BLD CALC: 38.4 % (ref 37–47)
HEMOGLOBIN: 11.5 GM/DL (ref 12.5–16)
IMMATURE NEUTROPHIL %: 0.3 % (ref 0–0.43)
LYMPHOCYTES ABSOLUTE: 2.2 K/CU MM
LYMPHOCYTES RELATIVE PERCENT: 33.4 % (ref 24–44)
MCH RBC QN AUTO: 33 PG (ref 27–31)
MCHC RBC AUTO-ENTMCNC: 29.9 % (ref 32–36)
MCV RBC AUTO: 110 FL (ref 78–100)
MONOCYTES ABSOLUTE: 0.6 K/CU MM
MONOCYTES RELATIVE PERCENT: 9.2 % (ref 0–4)
NUCLEATED RBC %: 0 %
PDW BLD-RTO: 13.7 % (ref 11.7–14.9)
PLATELET # BLD: 249 K/CU MM (ref 140–440)
PMV BLD AUTO: 11.2 FL (ref 7.5–11.1)
RBC # BLD: 3.49 M/CU MM (ref 4.2–5.4)
SEGMENTED NEUTROPHILS ABSOLUTE COUNT: 3.5 K/CU MM
SEGMENTED NEUTROPHILS RELATIVE PERCENT: 54.1 % (ref 36–66)
TOTAL IMMATURE NEUTOROPHIL: 0.02 K/CU MM
TOTAL NUCLEATED RBC: 0 K/CU MM
WBC # BLD: 6.4 K/CU MM (ref 4–10.5)

## 2021-03-22 PROCEDURE — 36415 COLL VENOUS BLD VENIPUNCTURE: CPT

## 2021-03-22 PROCEDURE — 85025 COMPLETE CBC W/AUTO DIFF WBC: CPT

## 2021-03-28 ENCOUNTER — APPOINTMENT (OUTPATIENT)
Dept: CT IMAGING | Age: 86
DRG: 305 | End: 2021-03-28
Payer: MEDICARE

## 2021-03-28 ENCOUNTER — HOSPITAL ENCOUNTER (INPATIENT)
Age: 86
LOS: 4 days | Discharge: NURSING, REHAB OR LONG-TERM CARE | DRG: 305 | End: 2021-04-02
Attending: EMERGENCY MEDICINE | Admitting: INTERNAL MEDICINE
Payer: MEDICARE

## 2021-03-28 ENCOUNTER — APPOINTMENT (OUTPATIENT)
Dept: GENERAL RADIOLOGY | Age: 86
DRG: 305 | End: 2021-03-28
Payer: MEDICARE

## 2021-03-28 DIAGNOSIS — R79.89 ELEVATED LACTIC ACID LEVEL: ICD-10-CM

## 2021-03-28 DIAGNOSIS — R41.82 ALTERED MENTAL STATUS, UNSPECIFIED ALTERED MENTAL STATUS TYPE: ICD-10-CM

## 2021-03-28 DIAGNOSIS — R53.83 OTHER FATIGUE: Primary | ICD-10-CM

## 2021-03-28 PROBLEM — G45.9 TIA (TRANSIENT ISCHEMIC ATTACK): Status: ACTIVE | Noted: 2021-03-28

## 2021-03-28 LAB
ALBUMIN SERPL-MCNC: 3.9 GM/DL (ref 3.4–5)
ALP BLD-CCNC: 57 IU/L (ref 40–128)
ALT SERPL-CCNC: 16 U/L (ref 10–40)
AMMONIA: 28 UMOL/L (ref 11–51)
ANION GAP SERPL CALCULATED.3IONS-SCNC: 14 MMOL/L (ref 4–16)
AST SERPL-CCNC: 24 IU/L (ref 15–37)
BACTERIA: ABNORMAL /HPF
BASOPHILS ABSOLUTE: 0.1 K/CU MM
BASOPHILS RELATIVE PERCENT: 1.1 % (ref 0–1)
BILIRUB SERPL-MCNC: 0.3 MG/DL (ref 0–1)
BILIRUBIN URINE: NEGATIVE MG/DL
BLOOD, URINE: NEGATIVE
BUN BLDV-MCNC: 16 MG/DL (ref 6–23)
CALCIUM SERPL-MCNC: 9.1 MG/DL (ref 8.3–10.6)
CHLORIDE BLD-SCNC: 101 MMOL/L (ref 99–110)
CLARITY: ABNORMAL
CO2: 23 MMOL/L (ref 21–32)
COLOR: YELLOW
CREAT SERPL-MCNC: 1 MG/DL (ref 0.6–1.1)
DIFFERENTIAL TYPE: ABNORMAL
EOSINOPHILS ABSOLUTE: 0.1 K/CU MM
EOSINOPHILS RELATIVE PERCENT: 1.6 % (ref 0–3)
GFR AFRICAN AMERICAN: >60 ML/MIN/1.73M2
GFR NON-AFRICAN AMERICAN: 51 ML/MIN/1.73M2
GLUCOSE BLD-MCNC: 150 MG/DL (ref 70–99)
GLUCOSE BLD-MCNC: 168 MG/DL (ref 70–99)
GLUCOSE, URINE: NEGATIVE MG/DL
HCT VFR BLD CALC: 39.3 % (ref 37–47)
HEMOGLOBIN: 12.8 GM/DL (ref 12.5–16)
IMMATURE NEUTROPHIL %: 0.3 % (ref 0–0.43)
KETONES, URINE: NEGATIVE MG/DL
LACTATE: 1.3 MMOL/L (ref 0.4–2)
LACTATE: 3.2 MMOL/L (ref 0.4–2)
LEUKOCYTE ESTERASE, URINE: NEGATIVE
LIPASE: 38 IU/L (ref 13–60)
LYMPHOCYTES ABSOLUTE: 2 K/CU MM
LYMPHOCYTES RELATIVE PERCENT: 30.6 % (ref 24–44)
MCH RBC QN AUTO: 33.3 PG (ref 27–31)
MCHC RBC AUTO-ENTMCNC: 32.6 % (ref 32–36)
MCV RBC AUTO: 102.3 FL (ref 78–100)
MONOCYTES ABSOLUTE: 0.4 K/CU MM
MONOCYTES RELATIVE PERCENT: 6.7 % (ref 0–4)
NITRITE URINE, QUANTITATIVE: NEGATIVE
NUCLEATED RBC %: 0 %
PDW BLD-RTO: 13.8 % (ref 11.7–14.9)
PH, URINE: 9 (ref 5–8)
PLATELET # BLD: 244 K/CU MM (ref 140–440)
PMV BLD AUTO: 10.4 FL (ref 7.5–11.1)
POTASSIUM SERPL-SCNC: 4.7 MMOL/L (ref 3.5–5.1)
PRO-BNP: 218.3 PG/ML
PROTEIN UA: NEGATIVE MG/DL
RBC # BLD: 3.84 M/CU MM (ref 4.2–5.4)
RBC URINE: 1 /HPF (ref 0–6)
SARS-COV-2, NAAT: NOT DETECTED
SEGMENTED NEUTROPHILS ABSOLUTE COUNT: 3.8 K/CU MM
SEGMENTED NEUTROPHILS RELATIVE PERCENT: 59.7 % (ref 36–66)
SODIUM BLD-SCNC: 138 MMOL/L (ref 135–145)
SOURCE: NORMAL
SPECIFIC GRAVITY UA: 1 (ref 1–1.03)
SQUAMOUS EPITHELIAL: 1 /HPF
TOTAL IMMATURE NEUTOROPHIL: 0.02 K/CU MM
TOTAL NUCLEATED RBC: 0 K/CU MM
TOTAL PROTEIN: 6.4 GM/DL (ref 6.4–8.2)
TRICHOMONAS: ABNORMAL /HPF
TROPONIN T: <0.01 NG/ML
UROBILINOGEN, URINE: NEGATIVE MG/DL (ref 0.2–1)
WBC # BLD: 6.4 K/CU MM (ref 4–10.5)
WBC UA: 1 /HPF (ref 0–5)

## 2021-03-28 PROCEDURE — 96372 THER/PROPH/DIAG INJ SC/IM: CPT

## 2021-03-28 PROCEDURE — 80053 COMPREHEN METABOLIC PANEL: CPT

## 2021-03-28 PROCEDURE — 83690 ASSAY OF LIPASE: CPT

## 2021-03-28 PROCEDURE — 84484 ASSAY OF TROPONIN QUANT: CPT

## 2021-03-28 PROCEDURE — 83605 ASSAY OF LACTIC ACID: CPT

## 2021-03-28 PROCEDURE — 83880 ASSAY OF NATRIURETIC PEPTIDE: CPT

## 2021-03-28 PROCEDURE — 96361 HYDRATE IV INFUSION ADD-ON: CPT

## 2021-03-28 PROCEDURE — 2580000003 HC RX 258: Performed by: PHYSICIAN ASSISTANT

## 2021-03-28 PROCEDURE — 82962 GLUCOSE BLOOD TEST: CPT

## 2021-03-28 PROCEDURE — 87040 BLOOD CULTURE FOR BACTERIA: CPT

## 2021-03-28 PROCEDURE — 93005 ELECTROCARDIOGRAM TRACING: CPT | Performed by: PHYSICIAN ASSISTANT

## 2021-03-28 PROCEDURE — 6370000000 HC RX 637 (ALT 250 FOR IP): Performed by: NURSE PRACTITIONER

## 2021-03-28 PROCEDURE — 6360000002 HC RX W HCPCS: Performed by: INTERNAL MEDICINE

## 2021-03-28 PROCEDURE — 93010 ELECTROCARDIOGRAM REPORT: CPT | Performed by: INTERNAL MEDICINE

## 2021-03-28 PROCEDURE — 82140 ASSAY OF AMMONIA: CPT

## 2021-03-28 PROCEDURE — 81001 URINALYSIS AUTO W/SCOPE: CPT

## 2021-03-28 PROCEDURE — 96360 HYDRATION IV INFUSION INIT: CPT

## 2021-03-28 PROCEDURE — 6370000000 HC RX 637 (ALT 250 FOR IP): Performed by: INTERNAL MEDICINE

## 2021-03-28 PROCEDURE — 70450 CT HEAD/BRAIN W/O DYE: CPT

## 2021-03-28 PROCEDURE — G0378 HOSPITAL OBSERVATION PER HR: HCPCS

## 2021-03-28 PROCEDURE — 74176 CT ABD & PELVIS W/O CONTRAST: CPT

## 2021-03-28 PROCEDURE — 85025 COMPLETE CBC W/AUTO DIFF WBC: CPT

## 2021-03-28 PROCEDURE — 94761 N-INVAS EAR/PLS OXIMETRY MLT: CPT

## 2021-03-28 PROCEDURE — 87086 URINE CULTURE/COLONY COUNT: CPT

## 2021-03-28 PROCEDURE — 99285 EMERGENCY DEPT VISIT HI MDM: CPT

## 2021-03-28 PROCEDURE — 71045 X-RAY EXAM CHEST 1 VIEW: CPT

## 2021-03-28 PROCEDURE — 87635 SARS-COV-2 COVID-19 AMP PRB: CPT

## 2021-03-28 PROCEDURE — 2580000003 HC RX 258: Performed by: INTERNAL MEDICINE

## 2021-03-28 PROCEDURE — 71250 CT THORAX DX C-: CPT

## 2021-03-28 RX ORDER — MAGNESIUM GLUCONATE 27 MG(500)
500 TABLET ORAL DAILY
Status: DISCONTINUED | OUTPATIENT
Start: 2021-03-29 | End: 2021-04-02 | Stop reason: HOSPADM

## 2021-03-28 RX ORDER — ONDANSETRON 2 MG/ML
4 INJECTION INTRAMUSCULAR; INTRAVENOUS EVERY 6 HOURS PRN
Status: DISCONTINUED | OUTPATIENT
Start: 2021-03-28 | End: 2021-04-02 | Stop reason: HOSPADM

## 2021-03-28 RX ORDER — 0.9 % SODIUM CHLORIDE 0.9 %
500 INTRAVENOUS SOLUTION INTRAVENOUS ONCE
Status: COMPLETED | OUTPATIENT
Start: 2021-03-28 | End: 2021-03-28

## 2021-03-28 RX ORDER — ASPIRIN 81 MG/1
81 TABLET ORAL DAILY
Status: DISCONTINUED | OUTPATIENT
Start: 2021-03-28 | End: 2021-04-02 | Stop reason: HOSPADM

## 2021-03-28 RX ORDER — LOSARTAN POTASSIUM 100 MG/1
100 TABLET ORAL DAILY
Status: DISCONTINUED | OUTPATIENT
Start: 2021-03-28 | End: 2021-04-02 | Stop reason: HOSPADM

## 2021-03-28 RX ORDER — MAGNESIUM GLUCONATE 27 MG(500)
500 TABLET ORAL DAILY
Status: DISCONTINUED | OUTPATIENT
Start: 2021-03-28 | End: 2021-03-28 | Stop reason: SDUPTHER

## 2021-03-28 RX ORDER — ACETAMINOPHEN 325 MG/1
650 TABLET ORAL EVERY 4 HOURS PRN
Status: DISCONTINUED | OUTPATIENT
Start: 2021-03-28 | End: 2021-04-02 | Stop reason: HOSPADM

## 2021-03-28 RX ORDER — GABAPENTIN 300 MG/1
300 CAPSULE ORAL NIGHTLY
Status: DISCONTINUED | OUTPATIENT
Start: 2021-03-28 | End: 2021-04-02 | Stop reason: HOSPADM

## 2021-03-28 RX ORDER — MULTIVIT-MIN/IRON/FOLIC ACID/K 18-600-40
20000 CAPSULE ORAL NIGHTLY
Status: DISCONTINUED | OUTPATIENT
Start: 2021-03-28 | End: 2021-03-28

## 2021-03-28 RX ORDER — DILTIAZEM HYDROCHLORIDE 240 MG/1
240 CAPSULE, EXTENDED RELEASE ORAL DAILY
Status: ON HOLD | COMMUNITY
End: 2021-04-02 | Stop reason: HOSPADM

## 2021-03-28 RX ORDER — ROSUVASTATIN CALCIUM 20 MG/1
40 TABLET, COATED ORAL NIGHTLY
Status: DISCONTINUED | OUTPATIENT
Start: 2021-03-28 | End: 2021-04-02 | Stop reason: HOSPADM

## 2021-03-28 RX ORDER — ASCORBIC ACID 500 MG
1000 TABLET ORAL DAILY
Status: DISCONTINUED | OUTPATIENT
Start: 2021-03-28 | End: 2021-04-02 | Stop reason: HOSPADM

## 2021-03-28 RX ORDER — SODIUM CHLORIDE 9 MG/ML
25 INJECTION, SOLUTION INTRAVENOUS PRN
Status: DISCONTINUED | OUTPATIENT
Start: 2021-03-28 | End: 2021-04-02 | Stop reason: HOSPADM

## 2021-03-28 RX ORDER — LEVOTHYROXINE SODIUM 0.07 MG/1
75 TABLET ORAL DAILY
Status: DISCONTINUED | OUTPATIENT
Start: 2021-03-28 | End: 2021-04-02 | Stop reason: HOSPADM

## 2021-03-28 RX ORDER — TRAMADOL HYDROCHLORIDE 50 MG/1
25 TABLET ORAL EVERY 6 HOURS PRN
Status: DISCONTINUED | OUTPATIENT
Start: 2021-03-28 | End: 2021-04-02 | Stop reason: HOSPADM

## 2021-03-28 RX ORDER — DILTIAZEM HYDROCHLORIDE 240 MG/1
240 CAPSULE, COATED, EXTENDED RELEASE ORAL DAILY
Status: DISCONTINUED | OUTPATIENT
Start: 2021-03-28 | End: 2021-04-01

## 2021-03-28 RX ORDER — PROMETHAZINE HYDROCHLORIDE 25 MG/1
12.5 TABLET ORAL EVERY 6 HOURS PRN
Status: DISCONTINUED | OUTPATIENT
Start: 2021-03-28 | End: 2021-04-02 | Stop reason: HOSPADM

## 2021-03-28 RX ORDER — SODIUM CHLORIDE 0.9 % (FLUSH) 0.9 %
10 SYRINGE (ML) INJECTION EVERY 12 HOURS SCHEDULED
Status: DISCONTINUED | OUTPATIENT
Start: 2021-03-28 | End: 2021-04-02 | Stop reason: HOSPADM

## 2021-03-28 RX ORDER — POLYETHYLENE GLYCOL 3350 17 G/17G
17 POWDER, FOR SOLUTION ORAL DAILY PRN
Status: DISCONTINUED | OUTPATIENT
Start: 2021-03-28 | End: 2021-04-02 | Stop reason: HOSPADM

## 2021-03-28 RX ORDER — SODIUM CHLORIDE 0.9 % (FLUSH) 0.9 %
10 SYRINGE (ML) INJECTION PRN
Status: DISCONTINUED | OUTPATIENT
Start: 2021-03-28 | End: 2021-04-02 | Stop reason: HOSPADM

## 2021-03-28 RX ORDER — ASPIRIN 300 MG/1
300 SUPPOSITORY RECTAL DAILY
Status: DISCONTINUED | OUTPATIENT
Start: 2021-03-28 | End: 2021-04-02 | Stop reason: HOSPADM

## 2021-03-28 RX ORDER — ASPIRIN 81 MG/1
81 TABLET, CHEWABLE ORAL DAILY
Status: DISCONTINUED | OUTPATIENT
Start: 2021-03-28 | End: 2021-03-28 | Stop reason: SDUPTHER

## 2021-03-28 RX ADMIN — SODIUM CHLORIDE, PRESERVATIVE FREE 10 ML: 5 INJECTION INTRAVENOUS at 20:12

## 2021-03-28 RX ADMIN — SODIUM CHLORIDE 500 ML: 9 INJECTION, SOLUTION INTRAVENOUS at 12:30

## 2021-03-28 RX ADMIN — ENOXAPARIN SODIUM 40 MG: 40 INJECTION SUBCUTANEOUS at 17:53

## 2021-03-28 RX ADMIN — ACETAMINOPHEN 650 MG: 325 TABLET ORAL at 21:51

## 2021-03-28 RX ADMIN — TRAMADOL HYDROCHLORIDE 25 MG: 50 TABLET, FILM COATED ORAL at 21:51

## 2021-03-28 ASSESSMENT — PAIN DESCRIPTION - PROGRESSION
CLINICAL_PROGRESSION: GRADUALLY WORSENING

## 2021-03-28 ASSESSMENT — PAIN DESCRIPTION - PAIN TYPE: TYPE: CHRONIC PAIN

## 2021-03-28 NOTE — ED PROVIDER NOTES
Emilee        PCP: Nelsy Lynch MD, MD    CHIEF COMPLAINT    Chief Complaint   Patient presents with    Extremity Weakness    Facial Droop    Altered Mental Status     Patient staffed with supervising physician Dr. Vito Hall is a 80 y.o. female who presents with increasing fatigue and confusion since yesterday. Son is helping take care of Andrew Love and noticed that patient has been more confused and fatigue since yesterday. Son called EMS today and they noted extremity weakness and facial droop. Patient denies any chest pain, shortness breath, cough, fever, abdominal pain. Additional history unable to be obtained due to the fact that the patient has decreased level of consciousness. REVIEW OF SYSTEMS    Unable to obtain due to the fact that the patient has decreased level of consciousness  See HPI and nursing notes for additional information. PAST MEDICAL & SURGICAL HISTORY    Past Medical History:   Diagnosis Date    Back pain     Hyperlipidemia     Hypertension     Hypothyroidism      Past Surgical History:   Procedure Laterality Date    HYSTERECTOMY      SIGMOIDOSCOPY N/A 3/16/2021    SIGMOIDOSCOPY DIAGNOSTIC FLEXIBLE performed [facility] Kc [facility]    No Known Allergies    SOCIAL & FAMILY HISTORY    Social History     Socioeconomic History    Marital status:       Spouse name: None    Number of children: None    Years of education: None    Highest education level: None   Occupational History    None   Social Needs    Financial resource strain: None    Food insecurity     Worry: None     Inability: None    Transportation needs     Medical: None     Non-medical: None   Tobacco Use    Smoking status: Never Smoker    Smokeless tobacco: Never Used   Substance and Sexual Activity    Alcohol use: Never     Frequency: Never    Drug use: Never    Sexual activity: None Lifestyle    Physical activity     Days per week: None     Minutes per session: None    Stress: None   Relationships    Social connections     Talks on phone: None     Gets together: None     Attends [employer] service: None     Active member of club or organization: None     Attends meetings of clubs or organizations: None     Relationship status: None    Intimate partner violence     Fear of current or ex partner: None     Emotionally abused: None     Physically abused: None     Forced sexual activity: None   Other Topics Concern    None   Social History Narrative    None     History reviewed. No pertinent family history. PHYSICAL EXAM    VITAL SIGNS: BP (!) 221/78   Pulse 81   Temp 97.6 °F (36.4 °C) (Oral)   Resp 18   SpO2 99%    Constitutional: Elderly female, no acute distress  Eyes: Pupils equally round and reactive to light, sclera nonicteric  HENT:  Atraumatic, moist mucus membranes. Neck/Lymphatics: supple, no JVD, no swollen nodes  Respiratory: Lungs clear bilaterally  Cardiovascular: Normal rate and rhythm  GI:  Soft, nontender, normal bowel sounds  Musculoskeletal:  No edema, no acute deformities  Integument:  Warm and dry skin, no petechiae   Neurologic:    Alert & oriented, No focal deficits noted. Questionable slight right facial droop otherwise cranial nerves II through XII grossly intact. Normal gross motor coordination & motor strength bilateral upper and lower extremities. Sensation intact.   Neuro exam limited due to patient cooperation      Labs:  Results for orders placed or performed during the hospital encounter of 03/28/21   COVID-19, Rapid    Specimen: Nasopharyngeal   Result Value Ref Range    Source UNKNOWN     SARS-CoV-2, NAAT NOT DETECTED    CBC Auto Differential   Result Value Ref Range    WBC 6.4 4.0 - 10.5 K/CU MM    RBC 3.84 (L) 4.2 - 5.4 M/CU MM    Hemoglobin 12.8 12.5 - 16.0 GM/DL    Hematocrit 39.3 37 - 47 %    .3 (H) 78 - 100 FL    MCH 33.3 (H) 27 - 31 PG MCHC 32.6 32.0 - 36.0 %    RDW 13.8 11.7 - 14.9 %    Platelets 301 323 - 565 K/CU MM    MPV 10.4 7.5 - 11.1 FL    Differential Type AUTOMATED DIFFERENTIAL     Segs Relative 59.7 36 - 66 %    Lymphocytes % 30.6 24 - 44 %    Monocytes % 6.7 (H) 0 - 4 %    Eosinophils % 1.6 0 - 3 %    Basophils % 1.1 (H) 0 - 1 %    Segs Absolute 3.8 K/CU MM    Lymphocytes Absolute 2.0 K/CU MM    Monocytes Absolute 0.4 K/CU MM    Eosinophils Absolute 0.1 K/CU MM    Basophils Absolute 0.1 K/CU MM    Nucleated RBC % 0.0 %    Total Nucleated RBC 0.0 K/CU MM    Total Immature Neutrophil 0.02 K/CU MM    Immature Neutrophil % 0.3 0 - 0.43 %   Comprehensive Metabolic Panel   Result Value Ref Range    Sodium 138 135 - 145 MMOL/L    Potassium 4.7 3.5 - 5.1 MMOL/L    Chloride 101 99 - 110 mMol/L    CO2 23 21 - 32 MMOL/L    BUN 16 6 - 23 MG/DL    CREATININE 1.0 0.6 - 1.1 MG/DL    Glucose 150 (H) 70 - 99 MG/DL    Calcium 9.1 8.3 - 10.6 MG/DL    Albumin 3.9 3.4 - 5.0 GM/DL    Total Protein 6.4 6.4 - 8.2 GM/DL    Total Bilirubin 0.3 0.0 - 1.0 MG/DL    ALT 16 10 - 40 U/L    AST 24 15 - 37 IU/L    Alkaline Phosphatase 57 40 - 128 IU/L    GFR Non- 51 (L) >60 mL/min/1.73m2    GFR African American >60 >60 mL/min/1.73m2    Anion Gap 14 4 - 16   Troponin   Result Value Ref Range    Troponin T <0.010 <0.01 NG/ML   Lipase   Result Value Ref Range    Lipase 38 13 - 60 IU/L   Lactic Acid, Plasma   Result Value Ref Range    Lactate 3.2 (HH) 0.4 - 2.0 mMOL/L   Urinalysis   Result Value Ref Range    Color, UA YELLOW YELLOW    Clarity, UA HAZY (A) CLEAR    Glucose, Urine NEGATIVE NEGATIVE MG/DL    Bilirubin Urine NEGATIVE NEGATIVE MG/DL    Ketones, Urine NEGATIVE NEGATIVE MG/DL    Specific Gravity, UA 1.003 1.001 - 1.035    Blood, Urine NEGATIVE NEGATIVE    pH, Urine 9.0 (HH) 5.0 - 8.0    Protein, UA NEGATIVE NEGATIVE MG/DL    Urobilinogen, Urine NEGATIVE 0.2 - 1.0 MG/DL    Nitrite Urine, Quantitative NEGATIVE NEGATIVE    Leukocyte Esterase, Urine NEGATIVE NEGATIVE    RBC, UA 1 0 - 6 /HPF    WBC, UA 1 0 - 5 /HPF    Bacteria, UA OCCASIONAL (A) NEGATIVE /HPF    Squam Epithel, UA 1 /HPF    Trichomonas, UA NONE SEEN NONE SEEN /HPF   Brain Natriuretic Peptide   Result Value Ref Range    Pro-.3 <300 PG/ML   Ammonia Level   Result Value Ref Range    Ammonia 28 11 - 51 UMOL/L   Lactic Acid, Plasma   Result Value Ref Range    Lactate 1.3 0.4 - 2.0 mMOL/L   POCT Glucose   Result Value Ref Range    POC Glucose 168 (H) 70 - 99 MG/DL   EKG 12 Lead   Result Value Ref Range    Ventricular Rate 80 BPM    Atrial Rate 80 BPM    P-R Interval 198 ms    QRS Duration 82 ms    Q-T Interval 392 ms    QTc Calculation (Bazett) 452 ms    P Axis 51 degrees    R Axis 27 degrees    T Axis 45 degrees    Diagnosis       Normal sinus rhythm  Possible Anterior infarct , age undetermined  Abnormal ECG  When compared with ECG of 14-MAR-2021 10:02,  Criteria for Inferior infarct are no longer present           EKG      EKG Interpretation  Please see ED physician's note for EKG interpretation        RADIOLOGY/PROCEDURES    CT ABDOMEN PELVIS WO CONTRAST Additional Contrast? None   Final Result   1. Cholelithiasis without CT findings of acute cholecystitis. 2.  No CT evidence of an acute intra-abdominal or intrapelvic process. 3.  Calcific atherosclerotic disease aorta. 4.  Diverticulosis coli without CT evidence of acute diverticulitis. CT CHEST WO CONTRAST   Final Result   1. No pneumothorax. 2. Mild senescent pulmonary changes. No acute pulmonary disease. 3.  Vascular calcifications are noted reflecting calcific atherosclerosis. XR CHEST PORTABLE   Final Result   Possible small right apical pneumothorax. CT HEAD WO CONTRAST   Final Result   No acute intracranial abnormality. MRI brain without contrast    (Results Pending)   VL DUP CAROTID BILATERAL    (Results Pending)         ED COURSE & MEDICAL DECISION MAKING      Patient presents as above. POC glucose is 168. Son presents to bedside and states that mother has been more confused since yesterday. Patient does have questionable right facial droop, strength equal to bilateral upper extremities. No stroke alert called as last known well was yesterday morning. See physician note for EKG reading. Urinalysis with occasional bacteria but negative nuclear leukocytes, nitrites and 1 white blood cell. Urine sent for culture. CBC within normal limits. Rapid Covid negative. CMP within normal limits. Troponin negative. Lipase 38. . Lactic acid is 3.2. Patient given 500 cc IV fluid bolus. Ammonia 28. EtOH shows no acute intracranial abnormality. Chest x-ray shows possible small right apical pneumothorax. CT chest, abdomen pelvis without contrast ordered. CT chest with no pneumothorax, no acute pulmonary disease. CT abdomen pelvis without contrast shows cholelithiasis without findings of acute cholecystitis, no acute abnormality. I believe patient requires admission for increased confusion, elevated lactic acid. Consult hospitalist who accepts admission. Clinical  IMPRESSION    1. Other fatigue    2. Elevated lactic acid level    3. Altered mental status, unspecified altered mental status type          PLAN:   Admission to the hospital      Comment: Please note this report has been produced using speech recognition software and may contain errors related to that system including errors in grammar, punctuation, and spelling, as well as words and phrases that may be inappropriate. If there are any questions or concerns please feel free to contact the dictating provider for clarification.       Merry White PA-C  03/28/21 4734

## 2021-03-28 NOTE — ED PROVIDER NOTES
Durations are unremarkable.       ST Segments: no acute change  No significant change from prior EKG dated 3-           Leopoldo Anderson, MD  03/28/21 [address], [place]  03/28/21 [address] T[address] Levi MD  03/28/21 6334

## 2021-03-28 NOTE — H&P
hypertension, recent GI bleed, hypothyroidism, arthritis, who presents who presents with confusion. Patient was brought in by EMS, patient son at the bedside who provided most of the history. Reportedly, patient was last known well last night before she went to bed. She woke up early this morning and was found to be confused with weakness of both lower extremities. Her son was concerned about the confusion and some weakness of her extremities. He states that she had a history of stroke in the past- around 2001. Her speech was normal.  There was no reported facial asymmetry. Denies any fever. She has no nausea or vomiting. There is no chest pain. Ten point ROS reviewed negative, unless as noted above    Objective:   No intake or output data in the 24 hours ending 03/28/21 1428   Vitals:   Vitals:    03/28/21 1402   BP: (!) 219/57   Pulse: 71   Resp: 12   Temp:    SpO2: 100%     Physical Exam:    GEN Awake female, resting in bed in no apparent distress. Appears given age. EYES Pupils are equally round. No scleral erythema, discharge, or conjunctivitis. HENT Mucous membranes are moist.   NECK No apparent thyromegaly or masses. RESP Clear to auscultation, no wheezes, rales or rhonchi. Symmetric chest movement while on room air. CARDIO/VASC S1/S2 auscultated. Regular rate without appreciable murmurs, rubs, or gallops. Peripheral pulses equal bilaterally and palpable. No peripheral edema. GI Abdomen is soft without significant tenderness, masses, or guarding. Bowel sounds are normoactive. Rectal exam deferred.  Lundberg catheter is not present. HEME/LYMPH No petechiae or ecchymoses. MSK No gross joint deformities. Spontaneous movement of all extremities  SKIN Normal coloration, warm, dry. NEURO Cranial nerves appear grossly intact, normal speech, no lateralizing weakness. Drift? PSYCH Awake, alert, oriented x 4. Affect appropriate.     Past Medical History:      Past Medical History:   Diagnosis Date    Back pain     Hyperlipidemia     Hypertension     Hypothyroidism      PSHX:  has a past surgical history that includes Hysterectomy and sigmoidoscopy (N/A, 3/16/2021). Allergies: No Known Allergies    FAM HX: Reviewed with found to be noncontributory  Soc HX:   Social History     Socioeconomic History    Marital status:      Spouse name: None    Number of children: None    Years of education: None    Highest education level: None   Occupational History    None   Social Needs    Financial resource strain: None    Food insecurity     Worry: None     Inability: None    Transportation needs     Medical: None     Non-medical: None   Tobacco Use    Smoking status: Never Smoker    Smokeless tobacco: Never Used   Substance and Sexual Activity    Alcohol use: Never     Frequency: Never    Drug use: Never    Sexual activity: None   Lifestyle    Physical activity     Days per week: None     Minutes per session: None    Stress: None   Relationships    Social connections     Talks on phone: None     Gets together: None     Attends Rastafari service: None     Active member of club or organization: None     Attends meetings of clubs or organizations: None     Relationship status: None    Intimate partner violence     Fear of current or ex partner: None     Emotionally abused: None     Physically abused: None     Forced sexual activity: None   Other Topics Concern    None   Social History Narrative    None       Medications:   Medications:    Infusions:   PRN Meds:   Current home medications   Prior to Admission medications    Medication Sig Start Date End Date Taking?  Authorizing Provider   dilTIAZem (TIADYLT ER) 240 MG extended release capsule Take 240 mg by mouth daily   Yes Historical Provider, MD   dilTIAZem (DILACOR XR) 240 MG extended release capsule Take 240 mg by mouth daily   Yes Historical Provider, MD   telmisartan (MICARDIS) 80 MG tablet Take 80 mg by mouth daily   Yes Historical Provider, MD   gabapentin (NEURONTIN) 300 MG capsule Take 300 mg by mouth nightly.    Yes Historical Provider, MD   levothyroxine (SYNTHROID) 75 MCG tablet Take 75 mcg by mouth Daily   Yes Historical Provider, MD   aspirin 81 MG chewable tablet Take 81 mg by mouth daily   Yes Historical Provider, MD   magnesium gluconate (MAGONATE) 500 MG tablet Take 500 mg by mouth daily   Yes Historical Provider, MD   Ascorbic Acid (VITAMIN C) 250 MG tablet Take 1,000 mg by mouth daily   Yes Historical Provider, MD   Multiple Vitamins-Minerals (WOMENS DAILY FORMULA PO) Take 1 tablet by mouth daily   Yes Historical Provider, MD   Cholecalciferol (VITAMIN D) 50 MCG (2000 UT) CAPS capsule Take 20,000 Units by mouth nightly    Historical Provider, MD Marco Antonio Pratt certify that Srinivasa Kaplan is expected to be hospitalized for less than 2 midnights based on the above assessment and plan:     Current diagnosis and plan of management discussed with the patient and family at the time of admission in lay language     Code status was discussed with patient / family  - Full code     Pain Assessment -no reports of the pain    Electronically signed by Zheng An MD on 3/28/2021 at 2:28 PM

## 2021-03-29 ENCOUNTER — APPOINTMENT (OUTPATIENT)
Dept: MRI IMAGING | Age: 86
DRG: 305 | End: 2021-03-29
Payer: MEDICARE

## 2021-03-29 ENCOUNTER — APPOINTMENT (OUTPATIENT)
Dept: ULTRASOUND IMAGING | Age: 86
DRG: 305 | End: 2021-03-29
Payer: MEDICARE

## 2021-03-29 LAB
CHOLESTEROL: 180 MG/DL
CULTURE: NORMAL
ESTIMATED AVERAGE GLUCOSE: 117 MG/DL
HBA1C MFR BLD: 5.7 % (ref 4.2–6.3)
HCT VFR BLD CALC: 38.1 % (ref 37–47)
HDLC SERPL-MCNC: 37 MG/DL
HEMOGLOBIN: 12 GM/DL (ref 12.5–16)
LDL CHOLESTEROL DIRECT: 119 MG/DL
Lab: NORMAL
MCH RBC QN AUTO: 32.5 PG (ref 27–31)
MCHC RBC AUTO-ENTMCNC: 31.5 % (ref 32–36)
MCV RBC AUTO: 103.3 FL (ref 78–100)
PDW BLD-RTO: 13.9 % (ref 11.7–14.9)
PLATELET # BLD: 227 K/CU MM (ref 140–440)
PMV BLD AUTO: 10.1 FL (ref 7.5–11.1)
RBC # BLD: 3.69 M/CU MM (ref 4.2–5.4)
SPECIMEN: NORMAL
TRIGL SERPL-MCNC: 261 MG/DL
WBC # BLD: 6 K/CU MM (ref 4–10.5)

## 2021-03-29 PROCEDURE — 97162 PT EVAL MOD COMPLEX 30 MIN: CPT

## 2021-03-29 PROCEDURE — 83036 HEMOGLOBIN GLYCOSYLATED A1C: CPT

## 2021-03-29 PROCEDURE — 6370000000 HC RX 637 (ALT 250 FOR IP): Performed by: NURSE PRACTITIONER

## 2021-03-29 PROCEDURE — 70551 MRI BRAIN STEM W/O DYE: CPT

## 2021-03-29 PROCEDURE — 80061 LIPID PANEL: CPT

## 2021-03-29 PROCEDURE — 93880 EXTRACRANIAL BILAT STUDY: CPT

## 2021-03-29 PROCEDURE — 94761 N-INVAS EAR/PLS OXIMETRY MLT: CPT

## 2021-03-29 PROCEDURE — G0378 HOSPITAL OBSERVATION PER HR: HCPCS

## 2021-03-29 PROCEDURE — 92610 EVALUATE SWALLOWING FUNCTION: CPT | Performed by: SPEECH-LANGUAGE PATHOLOGIST

## 2021-03-29 PROCEDURE — 97530 THERAPEUTIC ACTIVITIES: CPT

## 2021-03-29 PROCEDURE — 2580000003 HC RX 258: Performed by: INTERNAL MEDICINE

## 2021-03-29 PROCEDURE — 36415 COLL VENOUS BLD VENIPUNCTURE: CPT

## 2021-03-29 PROCEDURE — 83721 ASSAY OF BLOOD LIPOPROTEIN: CPT

## 2021-03-29 PROCEDURE — 6360000002 HC RX W HCPCS: Performed by: INTERNAL MEDICINE

## 2021-03-29 PROCEDURE — 96372 THER/PROPH/DIAG INJ SC/IM: CPT

## 2021-03-29 PROCEDURE — 85027 COMPLETE CBC AUTOMATED: CPT

## 2021-03-29 PROCEDURE — 1200000000 HC SEMI PRIVATE

## 2021-03-29 PROCEDURE — 97116 GAIT TRAINING THERAPY: CPT

## 2021-03-29 PROCEDURE — 6370000000 HC RX 637 (ALT 250 FOR IP): Performed by: INTERNAL MEDICINE

## 2021-03-29 PROCEDURE — 97535 SELF CARE MNGMENT TRAINING: CPT

## 2021-03-29 PROCEDURE — 97166 OT EVAL MOD COMPLEX 45 MIN: CPT

## 2021-03-29 RX ORDER — HYDRALAZINE HYDROCHLORIDE 25 MG/1
25 TABLET, FILM COATED ORAL EVERY 8 HOURS SCHEDULED
Status: DISCONTINUED | OUTPATIENT
Start: 2021-03-29 | End: 2021-03-30

## 2021-03-29 RX ORDER — AMLODIPINE BESYLATE 10 MG/1
10 TABLET ORAL DAILY
Status: DISCONTINUED | OUTPATIENT
Start: 2021-03-29 | End: 2021-03-29

## 2021-03-29 RX ADMIN — LEVOTHYROXINE SODIUM 75 MCG: 0.07 TABLET ORAL at 06:08

## 2021-03-29 RX ADMIN — HYDRALAZINE HYDROCHLORIDE 25 MG: 25 TABLET, FILM COATED ORAL at 18:17

## 2021-03-29 RX ADMIN — LOSARTAN POTASSIUM 100 MG: 100 TABLET, FILM COATED ORAL at 10:31

## 2021-03-29 RX ADMIN — DILTIAZEM HYDROCHLORIDE 240 MG: 240 CAPSULE, EXTENDED RELEASE ORAL at 10:32

## 2021-03-29 RX ADMIN — SODIUM CHLORIDE, PRESERVATIVE FREE 10 ML: 5 INJECTION INTRAVENOUS at 10:32

## 2021-03-29 RX ADMIN — OXYCODONE HYDROCHLORIDE AND ACETAMINOPHEN 1000 MG: 500 TABLET ORAL at 10:32

## 2021-03-29 RX ADMIN — Medication 2000 UNITS: at 21:37

## 2021-03-29 RX ADMIN — SODIUM CHLORIDE, PRESERVATIVE FREE 10 ML: 5 INJECTION INTRAVENOUS at 21:37

## 2021-03-29 RX ADMIN — ENOXAPARIN SODIUM 40 MG: 40 INJECTION SUBCUTANEOUS at 10:33

## 2021-03-29 RX ADMIN — ACETAMINOPHEN 650 MG: 325 TABLET ORAL at 02:43

## 2021-03-29 RX ADMIN — ACETAMINOPHEN 650 MG: 325 TABLET ORAL at 21:46

## 2021-03-29 ASSESSMENT — PAIN SCALES - GENERAL
PAINLEVEL_OUTOF10: 0
PAINLEVEL_OUTOF10: 2
PAINLEVEL_OUTOF10: 0
PAINLEVEL_OUTOF10: 0

## 2021-03-29 NOTE — CONSULTS
RENAL DOSE ADJUSTMENT MADE PER P/T PROTOCOL    PREVIOUS ORDER:  Enoxaparin 40mg subq daily    CrCl cannot be calculated (Unknown ideal weight. ). Recent Labs     03/28/21  1139 03/29/21  0612   BUN 16  --    CREATININE 1.0  --     227     ESTIMATED CRCL <30    NEW RENALLY ADJUSTED ORDER:  ENOXAPARIN 30MG SUBQ DAILY    Hoang Bolanos.  [facility]  3/29/2021 3:55 PM

## 2021-03-29 NOTE — CONSULTS
[facility] CARE OCCUPATIONAL THERAPY EVALUATION    Phill Haynes, 11/11/1920, 3012/3012-A, 3/29/2021    Discharge Recommendation: Oli Anthony      History:  [place]:  The primary encounter diagnosis was Other fatigue. Diagnoses of Elevated lactic acid level and Altered mental status, unspecified altered mental status type were also pertinent to this visit. Subjective:  Patient states: Agreeable to therapy. Pain: Pt denied pain this date  Communication with other providers: PT, RN, LSW  Restrictions: General Precautions, Fall Risk    Home Setup/Prior level of function:    Social/Functional History  Lives With: Alone  Type of Home: House  Home Layout: One level  Home Access: Stairs to enter without rails  Entrance Stairs - Number of Steps: 1  Bathroom Shower/Tub: (has been sponge bathing)  Bathroom Toilet: Standard  Home Equipment: 4 wheeled walker  ADL Assistance: Independent  Homemaking Assistance: (son manages meds. Pt can typically light meal prep and do he own laundry. Reports her son is very helpful when she needs him)  Ambulation Assistance: Independent  Transfer Assistance: Independent  Active : No  Patient's  Info: son  Occupation: Retired  Type of occupation: banker    **Taken from evaluation on 3/15/2021. **    Examination:  · Observation: Supine in bed upon arrival  · Vision: Reduced acuity related to degenerative changes. Wears glasses. · Hearing: [place] (wears hearing aids). · Vitals: Stable vitals throughout session    Body Systems and functions:  · ROM: WFL   · Strength: WFL   · Sensation: WFL  · Tone: Normal  · Coordination: WFL  · Perception: WNL    Activities of Daily Living (ADLs):  · Feeding: Danielle  setup and increased time. · Grooming: Jane pt stood at sink and performed oral hygiene and combed hair requiring CGA-Jane for balancing in standing. Pt required assist to accurately kavon toothpaste onto brush, likely related to decreased vision.    · UB bathing: SBA recommend pt complete in seated with setup, increased time, VC.  · LB bathing: SBA in seated with setup, increased time, VC.   · UB dressing: SBA in seated with setup, increased time, VC.   · LB dressing: Jane in seated pt donned shoes with assist to fully position onto feet, setup, increased time, VC. · Toileting: Jane pt required assist for toilet transfers, as well as assist to thoroughly complete renee care in seated. Cognitive and Psychosocial Functioning:  · Overall cognitive status: Pt was oriented to self, time, and location. Pt demo slight slow processing throughout session. Pt reports as of recently she has noted onset of memory concerns. · Affect: Normal     Balance:   · Sitting: Supervision (pt sat EOB demo good dynamic sitting balance). · Standing: CGA-Jane (pt stood at sink demo fair- fair+ dynamic standing balance, demo 1 LOB requiring assist to recover). Functional Mobility:   · Bed Mobility: NT this date. Pt was received seated in bathroom on the commode and returned to seated upright in chair. · Transfers:  · Jane  (pt performed STS from commode with Jane. VC throughout for sequencing and increased time). · Jane (stand to sit to chair with assist for positioning and controlled descend). · Ambulation: Jane (pt ambulated approx 100ft with RW. Demo fair pace throughout and required assist with RW management, slight unsteadiness throughout. See PT note for specific details on gait). AM-PAC 6 click short form for inpatient daily activity:   How much help from another person does the patient currently need. .. Unable  Dep A Lot  Max A A Lot   Mod A A Little  Min A A Little   CGA  SBA None   Mod I  Indep  Sup   1. Putting on and taking off regular lower body clothing? [] 1    [] 2   [] 2   [x] 3   [] 3   [] 4      2. Bathing (including washing, rinsing, drying)? [] 1   [] 2   [] 2 [] 3 [x] 3 [] 4   3.  Toileting, which includes using toilet, bedpan, or urinal? [] 1    []

## 2021-03-29 NOTE — CARE COORDINATION
Reviewed chart: per previous CM note from [address] [place] [address] at this time home with [facility] (sent PS to [facility] to follow). PT/OT evals pending. CM to follow up after evals in case discharge needs change. Received message back from [facility] that pt is not Active c [facility] as pt needs to establish with new PCP (Dr. Naina Foster not practicing and cannot follow) Per Leonor Pimentel pt has established with Dr. Sia Casey, sent message to [facility] to inquire. Per [facility] pt was not set up with [facility] but [facility]. TC to son Nishant Zavala and he would like pt to go to [facility] in [place] (1st choice) and [place] (2nd choice). TC to [facility] [phone], had to leave  for admissions.      Received call back from [facility] with [facility] ([phone]) she states she will review and hopes to have a bed for her faxed info to [phone]

## 2021-03-29 NOTE — PROGRESS NOTES
[facility]    Hospitalist Progress Note      Name:  Fernando Bowman /Age/Sex: 1920  (80 y.o. female)   MRN & CSN:  [id number] & [id number] Admission Date/Time: 3/28/2021 11:15 AM   Location:  [address] [place] [address] PCP: Nelsy Lynch MD, MD         Hospital Day: 2    Assessment and Plan:   Fernando Bowman is a 80 y.o.  female  past medical history of hypertension, recent GI bleed, hypothyroidism, arthritis, who presents who presents with confusion      Possible TIA      Patient presented with mild confusion, no speech issues, no weakness   Ammonia- normal, UA unremarkable, CT head in ED unremarkable,   Continue to monitor with NIH Stroke scale, Carotid doppler with ~69% stenosis of GARRY  Check MRI brain, ECHO, , A1c-5.7  Consult Neurology, PT/OT, Speech Therapy,   Continue with ASA, Lipitor      Hypertensive emergency     High blood pressure - transient - resumed home medications      Transient lactic acidosis - resolved     Lactate 3.2> 1.5, no sign of infection, WBC normal, UA unremarkable, CT chest no acute issues     Weakness and debility -age-related,     Likely compounded by hospital stay -was DC on -  PT/OT, maintain fall precautions      Hypothyroidism -on Synthroid    Diet DIET GENERAL;   DVT Prophylaxis [] Lovenox, []  Heparin, [] SCDs, []No VTE prophylaxis, patient ambulating   GI Prophylaxis [] PPI, [] H2 Blocker, [] No GI prophylaxis, patient is receiving diet/Tube Feeds   Code Status Full Code   Disposition Patient requires continued admission due to TIA   MDM [] Low, [x] Moderate,[]  High     History of Present Illness: Subjective     Patient Seen & Examined at the bedside      Patient is resting in her recliner with no distress while on room air - no more confused - now fully oriented   No weakness or speech abnormality    Ten point ROS reviewed negative, unless as noted above    Objective:   No intake or output data in the 24 hours ending 21 0754   Vitals:   Vitals:    21 0400 BP: 132/76   Pulse: 74   Resp: 16   Temp: 98 °F (36.7 °C)   SpO2: 96%     Physical Exam:    GEN Awake female, resting in bed in no apparent distress. Appears given age. HENT Mucous membranes are moist.   RESP Clear to auscultation, no wheezes, rales or rhonchi. CARDIO/VASC -S1/S2 auscultated. Regular rate without appreciable murmurs, rubs, or gallops. Peripheral pulses equal bilaterally and palpable. No peripheral edema. GI Abdomen is soft without significant tenderness, masses, or guarding. Bowel sounds are normoactive. Rectal exam deferred.  Lundberg catheter is not present. NEURO Legally blind, all other cranial nerves appear grossly intact, normal speech, no lateralizing weakness.     Medications:   Medications:    vitamin C  1,000 mg Oral Daily    dilTIAZem  240 mg Oral Daily    gabapentin  300 mg Oral Nightly    levothyroxine  75 mcg Oral Daily    losartan  100 mg Oral Daily    sodium chloride flush  10 mL Intravenous 2 times per day    enoxaparin  40 mg Subcutaneous Daily    aspirin  81 mg Oral Daily    Or    aspirin  300 mg Rectal Daily    rosuvastatin  40 mg Oral Nightly    magnesium gluconate  500 mg Oral Daily    vitamin D  2,000 Units Oral Nightly      Infusions:    sodium chloride       PRN Meds: sodium chloride flush, 10 mL, PRN  sodium chloride, 25 mL, PRN  promethazine, 12.5 mg, Q6H PRN    Or  ondansetron, 4 mg, Q6H PRN  polyethylene glycol, 17 g, Daily PRN  traMADol, 25 mg, Q6H PRN  acetaminophen, 650 mg, Q4H PRN          Electronically signed by Rangel Simmons MD on 3/29/2021 at 7:54 AM

## 2021-03-29 NOTE — PROGRESS NOTES
Physician Progress Note      PATIENTYadi Domingo  CSN #:                  [id number]  :                       1920  ADMIT DATE:       3/28/2021 11:15 AM  DISCH DATE:  RESPONDING  PROVIDER #:        Greg Pollock MD          QUERY TEXT:    Hospitalists,    Pt admitted with TIA and has confusion documented. If possible, please   document in the progress notes and discharge summary if you are evaluating and   / or treating any of the following: The medical record reflects the following:  Risk Factors: TIA, HTN, acidosis  Clinical Indicators: acidosis, TIA, HTN emergency  Treatment: labs, imaging, observation    Thank you,  Alondra Parra RN [facility]  [phone]  Options provided:  -- Hypertensive encephalopathy  -- Metabolic encephalopathy  -- Confusion due to##please specify, please specify  -- Other - I will add my own diagnosis  -- Disagree - Not applicable / Not valid  -- Disagree - Clinically unable to determine / Unknown  -- Refer to Clinical Documentation Reviewer    PROVIDER RESPONSE TEXT:    This patient has hypertensive encephalopathy.     Query created by: Andrei Calvin on 3/29/2021 2:04 PM      Electronically signed by:  Greg Pollock MD 3/29/2021 2:51 PM

## 2021-03-29 NOTE — DISCHARGE INSTR - COC
Continuity of Care Form    Patient Name: Emily Veliz   :  1920  MRN:  [id number]    Admit date:  3/28/2021  Discharge date:  **2021*    Code Status Order: Full Code   Advance Directives:   Advance Care Flowsheet Documentation       Date/Time Healthcare Directive Type of Healthcare Directive Copy in [address] Agent's Name Healthcare Agent's Phone Number    21 [phone]  Yes, patient has an advance directive for healthcare treatment  Health care treatment directive;Durable power of  for health care;Living will  Yes, copy in chart  Adult Children  Dashawn Herrera  [phone]            Admitting Physician:  Jaz Mendez MD  PCP: Vinny Dangelo MD, MD    Discharging Nurse: [address] Unit/Room#: 7191/7324-J  Discharging Unit Phone Number: [phone]    Emergency Contact:   Extended Emergency Contact Information  Primary Emergency Contact: Enid Rose Phone: [phone]  Relation: Child    Past Surgical History:  Past Surgical History:   Procedure Laterality Date    HYSTERECTOMY      SIGMOIDOSCOPY N/A 3/16/2021    SIGMOIDOSCOPY DIAGNOSTIC FLEXIBLE performed by Chantale Page MD at [facility] ENDOSCOPY       Immunization History: There is no immunization history on file for this patient.     Active Problems:  Patient Active Problem List   Diagnosis Code    GI bleed K92.2    TIA (transient ischemic attack) G45.9       Isolation/Infection:   Isolation            No Isolation          Patient Infection Status       Infection Onset Added Last Indicated Last Indicated By Review Planned Expiration Resolved Resolved By    None active    Resolved    COVID-19 Rule Out 21 COVID-19, Rapid (Ordered)   21 Rule-Out Test Resulted            Nurse Assessment:  Last Vital Signs: BP (!) 203/72   Pulse 81   Temp 98.1 °F (36.7 °C) (Oral)   Resp 15   Ht 4' 9\" (1.448 m)   Wt 133 lb 12.8 oz (60.7 kg) SpO2 97%   BMI 28.95 kg/m²     Last documented pain score (0-10 scale): Pain Level: 0  Last Weight:   Wt Readings from Last 1 Encounters:   03/29/21 133 lb 12.8 oz (60.7 kg)     Mental Status:  {IP PT MENTAL STATUS:36880}    IV Access:  - None    Nursing Mobility/ADLs:  Walking   Assisted  Transfer  Assisted  Bathing  Assisted  Dressing  Assisted  Toileting  Assisted  Feeding  Independent  Med Admin  Assisted  Med Delivery   whole    Wound Care Documentation and Therapy:        Elimination:  Continence:   · Bowel: Yes  · Bladder: Yes  Urinary Catheter: None   Colostomy/Ileostomy/Ileal Conduit: No       Date of Last BM: 04/01/2021  No intake or output data in the 24 hours ending 03/29/21 1236  No intake/output data recorded. Safety Concerns:     History of Falls (last 30 days)    Impairments/Disabilities:      Vision    Patient's personal belongings (please select all that are sent with patient):  None    RN SIGNATURE: Tracey Moore RN          PHYSICIAN SECTION        Nutrition Therapy:  Current Nutrition Therapy:   - Oral Diet:  Low Sodium (2gm)    Routes of Feeding: Oral  Liquids: no restriction  Daily Fluid Restriction: no  Last Modified Barium Swallow with Video (Video Swallowing Test): not done    Treatments at the Time of Hospital Discharge:   Respiratory Treatments:   Oxygen Therapy:  is not on home oxygen therapy.   Ventilator:    - No ventilator support    Rehab Therapies: Physical Therapy and Occupational Therapy  Weight Bearing Status/Restrictions: No weight bearing restirctions  Other Medical Equipment (for information only, NOT a DME order):  wheelchair  Other Treatments:     Prognosis: Good    Condition at Discharge: Stable    Rehab Potential (if transferring to Rehab): Good    Recommended Labs or Other Treatments After Discharge:     Physician Certification: I certify the above information and transfer of Sorin Hunter  is necessary for the continuing treatment of the diagnosis listed and that she requires [facility] for greater 30 days.      Update Admission H&P: No change in H&P    PHYSICIAN SIGNATURE:  Electronically signed by Marcos Pringle MD on 4/2/21 at 10:09 AM EDT

## 2021-03-29 NOTE — PROGRESS NOTES
Steph Navarro MD.  Section of General Neurology - Adult  Consult Note        Reason for Consult:    Requesting Physician:  No referring provider defined for this encounter. Thank you for your kind referral.    CHIEF COMPLAINT:  Facial droop confusion         HISTORY OF PRESENT ILLNESS:              The patient is a [place]  [age] y.o. female with a history of  female who presents with increasing fatigue and confusion since yesterday. Son is helping take care of Boni Marie and noticed that patient has been more confused and fatigue since yesterday. Son called EMS today and they noted extremity weakness and facial droop. Patient denies any chest pain, shortness breath, cough, fever, abdominal pain. Mri brain was neg for acute cva.pt had right carotid mild to moderate stenosis, right vertebral artery slow flow or occlusion. pts SBP was over 200. pt stopped taking her asa recently as she started using voltaren gel for her right shoulder arm pain due to an accidental fall. pt recently had a GI bleed from diverticulitis and was cleared by Dr. Marylee Smolder recently.     Past Medical History:        Diagnosis Date    Back pain     Hyperlipidemia     Hypertension     Hypothyroidism      Past Surgical History:        Procedure Laterality Date    HYSTERECTOMY      SIGMOIDOSCOPY N/A 3/16/2021    SIGMOIDOSCOPY DIAGNOSTIC FLEXIBLE performed by Tamara Escalante MD at [facility] ENDOSCOPY     Current Medications:   Current Facility-Administered Medications: [START ON 3/30/2021] enoxaparin (LOVENOX) injection 30 mg, 30 mg, Subcutaneous, Daily  hydrALAZINE (APRESOLINE) tablet 25 mg, 25 mg, Oral, 3 times per day  ascorbic acid (VITAMIN C) tablet 1,000 mg, 1,000 mg, Oral, Daily  dilTIAZem (CARDIZEM CD) extended release capsule 240 mg, 240 mg, Oral, Daily  gabapentin (NEURONTIN) capsule 300 mg, 300 mg, Oral, Nightly  levothyroxine (SYNTHROID) tablet 75 mcg, 75 mcg, Oral, Daily  losartan (COZAAR) tablet 100 mg, 100 mg, Oral, Daily  sodium chloride flush 0.9 % injection 10 mL, 10 mL, Intravenous, 2 times per day  sodium chloride flush 0.9 % injection 10 mL, 10 mL, Intravenous, PRN  0.9 % sodium chloride infusion, 25 mL, Intravenous, PRN  promethazine (PHENERGAN) tablet 12.5 mg, 12.5 mg, Oral, Q6H PRN **OR** ondansetron (ZOFRAN) injection 4 mg, 4 mg, Intravenous, Q6H PRN  polyethylene glycol (GLYCOLAX) packet 17 g, 17 g, Oral, Daily PRN  aspirin EC tablet 81 mg, 81 mg, Oral, Daily **OR** aspirin suppository 300 mg, 300 mg, Rectal, Daily  rosuvastatin (CRESTOR) tablet 40 mg, 40 mg, Oral, Nightly  magnesium gluconate (MAGONATE) tablet 500 mg, 500 mg, Oral, Daily  vitamin D CAPS 2,000 Units, 2,000 Units, Oral, Nightly  traMADol (ULTRAM) tablet 25 mg, 25 mg, Oral, Q6H PRN  acetaminophen (TYLENOL) tablet 650 mg, 650 mg, Oral, Q4H PRN  Allergies:  Patient has no known allergies. Social History:  TOBACCO:   reports that she has never smoked. She has never used smokeless tobacco.  ETOH:   reports no history of alcohol use. DRUGS:   reports no history of drug use. Family History:   History reviewed. No pertinent family history. REVIEW OF SYSTEMS:  CONSTITUTIONAL:  negative  HEENT:  negative  RESPIRATORY:  negative  CARDIOVASCULAR:  negative  GASTROINTESTINAL:  negative  GENITOURINARY:  negative  MUSCULOSKELETAL:  negative  BEHAVIOR/PSYCH:  Negative    ROS neg    Family hx neg    PHYSICAL EXAM  ------------------------  Vitals:  BP (!) 191/59   Pulse 81   Temp 98.2 °F (36.8 °C) (Oral)   Resp 17   Ht 4' 9\" (1.448 m)   Wt 133 lb 12.8 oz (60.7 kg)   SpO2 97%   BMI 28.95 kg/m²      General:  Awake, alert, oriented X 3. Well developed, well nourished, well groomed. No apparent distress. HEENT:  Normocephalic, atraumatic. Pupils equal, round, reactive to light. No scleral icterus. No conjunctival injection. Normal lips, teeth, and gums. No nasal discharge.   Neck:  Supple  Heart:  RRR, no murmurs, gallops, rubs  Lungs:  CTA bilaterally, bilat symmetrical expansion, no wheeze, rales, or rhonchi  Abdomen: Bowel sounds present, soft, nontender, no masses, no organomegaly, no peritoneal signs  Extremities:  No clubbing, cyanosis, or edema  Skin:  Warm and dry, no open lesions or rash  Breast: deferred  Rectal: deferred  Genitalia:  deferred    NEUROLOGICAL EXAM  ---------------------------------    Mental Status Exam:             Alert and oriented times three,follows commands,speech and language intact    Cranial Pniyam-MF-GLG Intact.         Cranial nerve II           Visual acuity:  normal                 Cranial nerve III           Pupils:  equal, round, reactive to light      Cranial nerves III, IV, VI           Extraocular Movements: intact      Cranial nerve V           Facial sensation:  intact      Cranial nerve VII           Facial strength: intact      Cranial nerve VIII           Hearing:  intact      Cranial nerve IX           Palate:  intact      Cranial nerve XI         Shoulder shrug:  intact      Cranial nerve XII          Tongue movement:  normal    Motor:    Drift:  absent  Motor exam is symmetrical 5 out of 5 all extremities bilaterally  Tone:  normal  Abnormal Movements:  Absent    DTRs-1+ biceps,triceps,brachioradialis,knee jerks and ankle jerks bilaterally symmetrical.  Toes-downgoing bilaterally            Sensory:normal sensation              CBC with Differential:    Lab Results   Component Value Date    WBC 6.0 03/29/2021    RBC 3.69 03/29/2021    HGB 12.0 03/29/2021    HCT 38.1 03/29/2021     03/29/2021    .3 03/29/2021    MCH 32.5 03/29/2021    MCHC 31.5 03/29/2021    RDW 13.9 03/29/2021    SEGSPCT 59.7 03/28/2021    LYMPHOPCT 30.6 03/28/2021    MONOPCT 6.7 03/28/2021    BASOPCT 1.1 03/28/2021    MONOSABS 0.4 03/28/2021    LYMPHSABS 2.0 03/28/2021    EOSABS 0.1 03/28/2021    BASOSABS 0.1 03/28/2021    DIFFTYPE AUTOMATED DIFFERENTIAL 03/28/2021     CMP:    Lab Results   Component Value Date     03/28/2021    K 4.7 03/28/2021    CL 101 03/28/2021    CO2 23 03/28/2021    BUN 16 03/28/2021    CREATININE 1.0 03/28/2021    GFRAA >60 03/28/2021    LABGLOM 51 03/28/2021    GLUCOSE 150 03/28/2021    PROT 6.4 03/28/2021    LABALBU 3.9 03/28/2021    CALCIUM 9.1 03/28/2021    BILITOT 0.3 03/28/2021    ALKPHOS 57 03/28/2021    AST 24 03/28/2021    ALT 16 03/28/2021     BMP:    Lab Results   Component Value Date     03/28/2021    K 4.7 03/28/2021     03/28/2021    CO2 23 03/28/2021    BUN 16 03/28/2021    LABALBU 3.9 03/28/2021    CREATININE 1.0 03/28/2021    CALCIUM 9.1 03/28/2021    GFRAA >60 03/28/2021    LABGLOM 51 03/28/2021    GLUCOSE 150 03/28/2021     PT/INR:    Lab Results   Component Value Date    PROTIME 12.5 03/14/2021    INR 1.03 03/14/2021     PTT:    Lab Results   Component Value Date    APTT 24.8 03/14/2021   [APTT  U/A:    Lab Results   Component Value Date    COLORU YELLOW 03/28/2021    WBCUA 1 03/28/2021    RBCUA 1 03/28/2021    TRICHOMONAS NONE SEEN 03/28/2021    BACTERIA OCCASIONAL 03/28/2021    CLARITYU HAZY 03/28/2021    SPECGRAV 1.003 03/28/2021    LEUKOCYTESUR NEGATIVE 03/28/2021    UROBILINOGEN NEGATIVE 03/28/2021    BILIRUBINUR NEGATIVE 03/28/2021    BLOODU NEGATIVE 03/28/2021     TSH:  No results found for: TSH  VITAMIN B12: No components found for: B12  FOLATE:  No results found for: FOLATE  RPR:  No results found for: RPR  CRISTIANA:  No results found for: ANATITER, CRISTIANA  Urine Toxicology:  No components found for: IAMMENTA, IBARBIT, IBENZO, ICOCAINE, IMARTHC, IOPIATES, IPHENCYC     IMPRESSION:    Hypertensive encephalopathy    TIA r/o cardio carotid embolic event    Recent GI bleed from diverticulitis per Son    Cerebellar microhemorrhages-cavernous malformations-Chronic    Right carotid 50-69 % stenosis    Right vertebral artery slow flow vs occlusion    PLAN:    Restart asa pt stopped taking recently as she was on Voltaren gel for her right shoulder arm pain from a fall ( due to recent GI bleed Plavix was felt to increase the risk espescially as she stopped asa recently )    Mri brain as above    CTA head neck as above    Echo pend    B 12 folate TSH homocysteine level    Discussed dx prognosis meds side effects and above with pt and her son and answered all questions. Peter Gill MD  BOARD CERTIFIED-NEUROLOGY.

## 2021-03-29 NOTE — PROGRESS NOTES
Speech Language Pathology  Facility/Department: [address] [place] [address]   CLINICAL BEDSIDE SWALLOW EVALUATION    NAME: Vamshi Rios  : 1920  MRN: [id number]    Impression  Dysphagia Diagnosis: Swallow function appears grossly intact  Dysphagia Outcome Severity Scale: Level 7: Normal in all situations     Vamshi Rios was seen for a clinical bedside swallow evaluation following admission for TIA, confusion. She is legally blind and [place] (without her hearing aides today) with no other medical history pertinent to this assessment. MRI is negative for acute infarct, CXR is negative. Pt was alert, pleasant and cooperative and oriented x 3 and followed all commands for oral motor exam which was WNL. Vocal quality and cough strength were WNL. PO trials of regular solids and thin liquids by straw completed with normal oropharyngeal swallow. Speech and language were WNL. Cognition appears at her baseline per/son, however, he reports confusion when pt becomes fatigued. Recommend:  Regular/Thins. No needs identified at this time. ADMISSION DATE: 3/28/2021  ADMITTING DIAGNOSIS: has GI bleed and TIA (transient ischemic attack) on their problem list.  ONSET DATE: 3/28/2021     Recent Chest Xray/CT of Chest: Chest CT 3/29         Impression   1. No pneumothorax. 2. Mild senescent pulmonary changes.  No acute pulmonary disease. 3.  Vascular calcifications are noted reflecting calcific atherosclerosis.      Date of Eval: 3/29/2021  Evaluating Therapist: Danyell Germain    Current Diet level:  Current Diet : Regular  Current Liquid Diet : Thin    Primary Complaint  Patient Complaint: denies    Pain:  Pain Assessment  Pain Assessment: 0-10  Pain Level: 0  Patient's Stated Pain Goal: No pain  Pain Type: Chronic pain  Clinical Progression: Gradually worsening  Response to Pain Intervention: Patient Satisfied, Asleep with RR greater than 10    Reason for Referral  Vamshi Rios was referred for a bedside swallow evaluation to assess the efficiency of her swallow function, identify signs and symptoms of aspiration and make recommendations regarding safe dietary consistencies, effective compensatory strategies, and safe eating environment. Treatment Plan  Requires SLP Intervention: No  Duration/Frequency of Treatment: n/a  D/C Recommendations: To be determined       Recommended Diet and Intervention  Diet Solids Recommendation: Regular  Liquid Consistency Recommendation: Thin  Recommended Form of Meds: PO  Recommendations: Self feed       Compensatory Swallowing Strategies       Treatment/Goals  Short-term Goals  Timeframe for Short-term Goals: n/a  Long-term Goals  Timeframe for Long-term Goals: n/a    General  Chart Reviewed: Yes  Behavior/Cognition: Alert; Cooperative;Pleasant mood  Respiratory Status: Room air  O2 Device: None (Room air)  Communication Observation: Functional  Follows Directions: Complex  Dentition: Adequate  Patient Positioning: Upright in chair  Baseline Vocal Quality: Normal  Volitional Cough: Strong  Prior Dysphagia History: denies  Consistencies Administered: Reg solid; Thin - straw           Vision/Hearing  Vision  Vision: Impaired  Hearing  Hearing: Exceptions to [facility]  Hearing Exceptions: Hard of hearing/hearing concerns    Oral Motor Deficits  Oral/Motor  Oral Motor: Within functional limits    Oral Phase Dysfunction  Oral Phase  Oral Phase: WNL     Indicators of Pharyngeal Phase Dysfunction   Pharyngeal Phase  Pharyngeal Phase: WNL    Prognosis  Prognosis  Prognosis for safe diet advancement: excellent  Individuals consulted  Consulted and agree with results and recommendations: Patient; Family member    Education  Patient Education: results WNL  Patient Education Response: Demonstrated understanding  Safety Devices in place: Yes  Type of devices: Left in chair       Therapy Time  SLP Individual Minutes  Time In: 1500  Time Out: 3738  Minutes: [address] [place]  3/29/2021 3:16 PM

## 2021-03-29 NOTE — PLAN OF CARE
Problem: Falls - Risk of:  Goal: Will remain free from falls  Description: Will remain free from falls  3/29/2021 0424 by Mai Bernstein LPN  Outcome: Ongoing     Problem: Falls - Risk of:  Goal: Absence of physical injury  Description: Absence of physical injury  3/29/2021 1029 by Melani Abebe LPN  Outcome: Ongoing     Problem: Skin Integrity:  Goal: Will show no infection signs and symptoms  Description: Will show no infection signs and symptoms  3/29/2021 0424 by Mai Bernstein LPN  Outcome: Ongoing     Problem: Skin Integrity:  Goal: Absence of new skin breakdown  Description: Absence of new skin breakdown  3/29/2021 0424 by Mai Bernstein LPN  Outcome: Ongoing

## 2021-03-29 NOTE — CONSULTS
[facility] 11/11/1920, 3012/3012-A, 3/29/2021    History  [place]:  The primary encounter diagnosis was Other fatigue. Diagnoses of Elevated lactic acid level and Altered mental status, unspecified altered mental status type were also pertinent to this visit. Patient  has a past medical history of Back pain, Hyperlipidemia, Hypertension, and Hypothyroidism. Patient  has a past surgical history that includes Hysterectomy and sigmoidoscopy (N/A, 3/16/2021). Subjective:  Patient states: \"This walker is crazy. \"    Pain:  Denies pain. Communication with other providers:  Handoff to RN, OT  Restrictions: general precautions, fall risk    Home Setup/Prior level of function    Lives With: Alone (son has been staying with pt after last stay)  Type of Home: House  Home Layout: One level  Home Access: Stairs to enter without rails  Entrance Stairs - Number of Steps: 1  Bathroom Shower/Tub: (has been sponge bathing)  Bathroom Toilet: Standard  Bathroom Equipment: 3-in-1 commode  Home Equipment: 4 wheeled walker  ADL Assistance: Independent  Homemaking Assistance: (son manages meds. Pt can typically light meal prep and do he own laundry. Reports her son is very helpful when she needs him)  Ambulation Assistance: Independent  Transfer Assistance: Independent  Active : No  Patient's  Info: son  Occupation: Retired  Type of occupation: banker    **taken from initial eval on 3/15/2021 and confirmed with pt**    Examination of body systems (includes body structures/functions, activity/participation limitations):  · Observation:  Pt on commode with RN present upon arrival and agreeable to therapy  · Vision:  Pt reports decreased vision  · Hearing:  WFL  · Cardiopulmonary:  No O2 needs  · Cognition: WFL, see OT/SLP note for further evaluation. Musculoskeletal  · ROM R/L:  WFL. · Strength R/L:  4/5, minimal impairment in function and endurance. · Neuro:  [facility]      Mobility:  · Rolling L/R:  NT  · Supine to sit:  NT  · Transfers: Pt completed STS from commode min A and to chair CGA with cues for hand placement and sequencing  · Sitting balance:  fair. · Standing balance:  Fair, pt stood at sink ~5 minutes with unilateral to bilateral UE support with CGA with two bouts of knee buckling requiring min A to recover. · Gait: Pt ambulated 61' with RW CGA to min A with decreased jasper, narrowed OFELIA, poor walker management, decreased step height on R LE, and intermittent difficulty with initiation of R LE. Assist provided for walker management and cues provided for safe ambulation. [facility] 6 Clicks Inpatient Mobility:  AM-PAC Inpatient Mobility Raw Score : 16    Safety: patient left in chair with alarm on, call light within reach, RN notified, gait belt used. Assessment:  Pt is a [age] [de-identified] year old female admitted to the hospital for a TIA. Pt is typically independent with Rollator for all ambulation and transfers. Pt currently requires min A for transfers and min A for ambulation with RW. Pt is presenting with decreased endurance, impaired balance, impaired gait, decreased strength, and impaired transfers. Pt would benefit from continued acute care PT as well as SNF placement after discharge to continue to address impairments . Complexity: moderate  Prognosis: Good, no significant barriers to participation at this time.    Plan Times per week: 3+/week     Equipment: none, pt owns necessary equipment    Goals:  Short term goals  Time Frame for Short term goals: 1 week  Short term goal 1: Pt to complete all bed mobility mod I  Short term goal 2: Pt to complete all STS transfers to/from bed, commode, and chair mod I  Short term goal 3: Pt to ambulate 48' with LRAD mod I       Treatment plan:  Bed mobility, transfers, balance, gait, TA, TX    Recommendations for NURSING mobility: amb with RW and gait belt min A    Time:   Time in: 0906  Time out: 0932 Timed treatment minutes: 15  Total time: 26    Electronically signed by:    Saran Kang, PT  3/29/2021, 12:40 PM

## 2021-03-30 LAB
LV EF: 53 %
LVEF MODALITY: NORMAL

## 2021-03-30 PROCEDURE — 93306 TTE W/DOPPLER COMPLETE: CPT

## 2021-03-30 PROCEDURE — 1200000000 HC SEMI PRIVATE

## 2021-03-30 PROCEDURE — 94761 N-INVAS EAR/PLS OXIMETRY MLT: CPT

## 2021-03-30 PROCEDURE — 6370000000 HC RX 637 (ALT 250 FOR IP): Performed by: INTERNAL MEDICINE

## 2021-03-30 PROCEDURE — 2580000003 HC RX 258: Performed by: INTERNAL MEDICINE

## 2021-03-30 PROCEDURE — 2500000003 HC RX 250 WO HCPCS: Performed by: HOSPITALIST

## 2021-03-30 PROCEDURE — 6370000000 HC RX 637 (ALT 250 FOR IP): Performed by: NURSE PRACTITIONER

## 2021-03-30 PROCEDURE — 6370000000 HC RX 637 (ALT 250 FOR IP): Performed by: HOSPITALIST

## 2021-03-30 RX ORDER — LABETALOL HYDROCHLORIDE 5 MG/ML
10 INJECTION, SOLUTION INTRAVENOUS ONCE
Status: DISCONTINUED | OUTPATIENT
Start: 2021-03-30 | End: 2021-03-30 | Stop reason: SDUPTHER

## 2021-03-30 RX ORDER — HYDRALAZINE HYDROCHLORIDE 50 MG/1
50 TABLET, FILM COATED ORAL EVERY 8 HOURS SCHEDULED
Status: DISCONTINUED | OUTPATIENT
Start: 2021-03-30 | End: 2021-03-31

## 2021-03-30 RX ORDER — LABETALOL HYDROCHLORIDE 5 MG/ML
10 INJECTION, SOLUTION INTRAVENOUS ONCE
Status: COMPLETED | OUTPATIENT
Start: 2021-03-30 | End: 2021-03-30

## 2021-03-30 RX ADMIN — OXYCODONE HYDROCHLORIDE AND ACETAMINOPHEN 1000 MG: 500 TABLET ORAL at 10:17

## 2021-03-30 RX ADMIN — HYDRALAZINE HYDROCHLORIDE 50 MG: 50 TABLET ORAL at 20:47

## 2021-03-30 RX ADMIN — Medication 2000 UNITS: at 20:47

## 2021-03-30 RX ADMIN — LOSARTAN POTASSIUM 100 MG: 100 TABLET, FILM COATED ORAL at 10:17

## 2021-03-30 RX ADMIN — HYDRALAZINE HYDROCHLORIDE 25 MG: 25 TABLET, FILM COATED ORAL at 05:41

## 2021-03-30 RX ADMIN — HYDRALAZINE HYDROCHLORIDE 50 MG: 50 TABLET ORAL at 14:04

## 2021-03-30 RX ADMIN — SODIUM CHLORIDE, PRESERVATIVE FREE 10 ML: 5 INJECTION INTRAVENOUS at 20:47

## 2021-03-30 RX ADMIN — ACETAMINOPHEN 650 MG: 325 TABLET ORAL at 20:51

## 2021-03-30 RX ADMIN — LABETALOL HYDROCHLORIDE 10 MG: 5 INJECTION, SOLUTION INTRAVENOUS at 16:22

## 2021-03-30 RX ADMIN — LEVOTHYROXINE SODIUM 75 MCG: 0.07 TABLET ORAL at 05:41

## 2021-03-30 RX ADMIN — ROSUVASTATIN CALCIUM 40 MG: 20 TABLET, COATED ORAL at 20:47

## 2021-03-30 RX ADMIN — DILTIAZEM HYDROCHLORIDE 240 MG: 240 CAPSULE, EXTENDED RELEASE ORAL at 10:17

## 2021-03-30 RX ADMIN — ASPIRIN 81 MG: 81 TABLET, COATED ORAL at 10:17

## 2021-03-30 ASSESSMENT — PAIN SCALES - GENERAL
PAINLEVEL_OUTOF10: 0
PAINLEVEL_OUTOF10: 3
PAINLEVEL_OUTOF10: 0

## 2021-03-30 ASSESSMENT — PAIN DESCRIPTION - PAIN TYPE: TYPE: CHRONIC PAIN

## 2021-03-30 ASSESSMENT — PAIN DESCRIPTION - LOCATION: LOCATION: GENERALIZED

## 2021-03-30 ASSESSMENT — PAIN DESCRIPTION - PROGRESSION: CLINICAL_PROGRESSION: NOT CHANGED

## 2021-03-30 NOTE — PROGRESS NOTES
Occupational Therapy  OT attempted tx this date. Pt with a BP of 227/102 (138) upon arrival. RN notified. Will defer today and re-attempt when vitals stabilize.

## 2021-03-30 NOTE — CARE COORDINATION
LSW called and spoke with Malachi Keene with [facility]. LSW gave referral and pt information was faxed to the facility. Malachi Keene stated she will look over the information and let this LSW if they can take pt.

## 2021-03-30 NOTE — CARE COORDINATION
TOYA spoke with Justo Pinto with [facility] at [facility] and she informed this LSW that they do not have any beds available. TOYA spoke with pt son and informed him of this info. Son requested ZAKIW called [facility] in [place]. TOYA called and left a message asking for a return call from the admission director.

## 2021-03-30 NOTE — PROGRESS NOTES
NEUROLOGY NOTE  DR. Anish Malagon MD.  -------------------------------------------------  Subjective:    Pt is doing better today    Denies any new symptoms      Doing better. Denies any new symptoms. Denies headache nausea vomiting dizziness    Denies numbness weakness extremities    Denies blurring of vision double vision    Objective:    BP (!) 171/72   Pulse 86   Temp 97.8 °F (36.6 °C) (Oral)   Resp 13   Ht 4' 9\" (1.448 m)   Wt 130 lb 3.2 oz (59.1 kg)   SpO2 97%   BMI 28.18 kg/m²   HEENT nl      Neuro exam    Alert Oriented  X 3  Follow simple commands  EOMI Pupils 3 mm ellie  5/5 all 4 extremities      RADIOLOGY  -----------------    Echocardiogram Complete 2d With Doppler With Color    Result Date: 3/30/2021  Transthoracic Echocardiography Report (TTE)  Demographics   Patient Name      NAVID COLON V        Date of Study       2021   Date of Birth     1920          Gender              Female   Age               Chiki  [age] year(s)         Race                   Patient Number    [id number]          Room Number         4966   Visit Number      [id number]   Corporate ID      [id number]   Accession Number  [id number]          Meño Mcneil MD  Interpreting        Vernon Archuleta  Physician                             Physician           Jai LAST  Procedure Type of Study   TTE procedure:ECHOCARDIOGRAM COMPLETE 2D W DOPPLER W COLOR. Procedure Date Date: 2021 Start: 09:04 AM Study Location: Portable Technical Quality: Limited visualization due to patient immobility. Indications:TIA. Patient Status: Routine HR: 80 bpm BP: 222/64 mmHg  Conclusions   Summary  Technically difficult examination. Left ventricular systolic function is normal.  Ejection fraction is visually estimated at 50-55%. Indeterminate diastolic function; E/A flow reversal is noted.   Mild to moderate aortic regurgitation ( ms). No evidence of any pericardial effusion. Patient refused agitated saline bubble study. Signature   ------------------------------------------------------------------  Electronically signed by Evy Apley MD  (Interpreting physician) on 03/30/2021 at 12:49 PM  ------------------------------------------------------------------   Findings   Left Ventricle  Left ventricular systolic function is normal.  Ejection fraction is visually estimated at 50-55%. Indeterminate diastolic function; E/A flow reversal is noted. Left Atrium  Essentially normal left atrium. Right Atrium  Essentially normal right atrium. Right Ventricle  Essentially normal right ventricle. Aortic Valve  Mild to moderate aortic regurgitation ( ms). Mitral Valve  Structurally normal mitral valve. Tricuspid Valve  Trace tricuspid regurgitation is present. Pulmonic Valve  The pulmonic valve was not well visualized. Pericardial Effusion  No evidence of any pericardial effusion. Miscellaneous  Patient refused agitated saline bubble study.   M-Mode/2D Measurements & Calculations   LV Diastolic Dimension:  LV Systolic Dimension:  LA Dimension: 3 cmAO Root  3.46 cm                  1.93 cm                 Dimension: 2.9 cmLA Area:  LV FS:44.2 %             LV Volume Diastolic: 51 95.1 cm2  LV PW Diastolic: 2.49 cm ml  LV PW Systolic: 1.41 cm  LV Volume Systolic: 16  Septum Diastolic: 3.57   ml  cm                       LV EDV/LV EDV Index: 51 RV Diastolic Dimension:  Septum Systolic: 2.25 cm mlLV ESV/LV ESV Index:  2.21 cm  CO: 4.1 l/min            16 ml                           EF Calculated (A4C):    LA/Aorta: 1.03                           68.6 %  LV Area Diastolic: 69.0  EF Calculated (2D):     LA volume/Index: 50 ml  cm2                      76.6 %  LV Area Systolic: 11 cm2                           LV Length: 7.11 cm                            LVOT: 1.9 cm  Doppler evident. The kidneys, spleen, adrenal glands and pancreas appear unremarkable. GI/Bowel: Multifocal diverticula involve the descending and sigmoid colon without evidence of acute inflammatory change. No diffuse or focal bowel wall thickening evident. No inflammatory changes evident. No obstruction is seen. The appendix is not clearly visualized. Pelvis: Hysterectomy. Partially filled urinary bladder appears unremarkable. No pelvic adenopathy or free fluid is seen. Vascular calcifications are noted reflecting calcific atherosclerosis. No pneumoperitoneum. Peritoneum/Retroperitoneum: Calcific atherosclerotic disease aorta. No aneurysm. Unremarkable appearance of the IVC. No adenopathy or fluid. No pneumoperitoneum. Bones/Soft Tissues: Degenerative changes throughout the lumbar spine with chronic grade 2 degenerative anterolisthesis L5 on S1. No acute superficial soft tissue or osseous structure abnormality evident. 1. Cholelithiasis without CT findings of acute cholecystitis. 2.  No CT evidence of an acute intra-abdominal or intrapelvic process. 3.  Calcific atherosclerotic disease aorta. 4.  Diverticulosis coli without CT evidence of acute diverticulitis. Ct Head Wo Contrast    Result Date: 3/28/2021  EXAMINATION: CT OF THE HEAD WITHOUT CONTRAST  3/28/2021 12:05 pm TECHNIQUE: CT of the head was performed without the administration of intravenous contrast. Dose modulation, iterative reconstruction, and/or weight based adjustment of the mA/kV was utilized to reduce the radiation dose to as low as reasonably achievable. COMPARISON: 09/14/2009. HISTORY: ORDERING SYSTEM PROVIDED HISTORY: altered TECHNOLOGIST PROVIDED HISTORY: Has a \"code stroke\" or \"stroke alert\" been called? ->No Reason for exam:->altered Decision Support Exception->Emergency Medical Condition (MA) Reason for Exam: altered Acuity: Acute Type of Exam: Initial FINDINGS: BRAIN/VENTRICLES: There is no acute intracranial hemorrhage, mass disease. 3.  Vascular calcifications are noted reflecting calcific atherosclerosis. Xr Chest Portable    Result Date: 3/28/2021  EXAMINATION: ONE XRAY VIEW OF THE CHEST 3/28/2021 11:27 am COMPARISON: 03/14/2021. HISTORY: ORDERING SYSTEM PROVIDED HISTORY: fatigue TECHNOLOGIST PROVIDED HISTORY: Reason for exam:->fatigue Reason for Exam: Extremity Weakness; Facial Droop; Altered Mental Status FINDINGS: The lungs are without acute focal process. No pleural effusion. Possible small right apical pneumothorax. .  The cardiomediastinal silhouette is without acute process. The osseous structures are without acute process. Possible small right apical pneumothorax. Xr Chest Portable    Result Date: 3/14/2021  EXAMINATION: ONE XRAY VIEW OF THE CHEST 3/14/2021 10:38 am COMPARISON: 02/24/2009 HISTORY: Mild hypoxia. FINDINGS: Heart is not enlarged. Severe atherosclerotic calcification of the aortic arch. No acute airspace disease, pleural effusion, pneumothorax. Osteopenia. No acute abnormality. Vl Dup Carotid Bilateral    Result Date: 3/29/2021  EXAMINATION: ULTRASOUND EVALUATION OF THE CAROTID ARTERIES 3/29/2021 5:37 am COMPARISON: Cervical spine CT 09/14/2009 HISTORY: ORDERING SYSTEM PROVIDED HISTORY: TIA TECHNOLOGIST PROVIDED HISTORY: Reason for exam:->TIA Reason for Exam: Dizziness Acuity: Acute Type of Exam: Ongoing FINDINGS: Patent common, internal, and external carotid arteries and vertebral arteries with antegrade flow. Tortuous carotid arteries especially on the right. Areas of turbulence in both the right common and internal carotid arteries. Suspected mild predominantly hard plaque in the right carotid bulb. Minimal to mild mixed plaque near the origin of the left internal carotid artery.  Peak systolic velocities as below: RIGHT Common carotid (proximal):  142 cm/s Common carotid (distal):  128 cm/s Internal carotid (proximal):  124 cm/s Internal carotid (mid):  107 cm/s Internal carotid (distal):  220 cm/s External carotid (proximal):  175 cm/s Vertebral:  42 cm/s ICA/CCA ratio: 1.5 LEFT Common carotid (proximal):  116 cm/s Common carotid (distal):  97 cm/s Internal carotid (proximal):  69 cm/s Internal carotid (mid):  77 cm/s Internal carotid (distal):  92 cm/s External carotid (proximal):  87 cm/s Vertebral:  79 cm/s ICA/CCA ratio:  0.8     1. 50-69% stenosis in the distal right internal carotid artery, likely closer to 69%. 2. Areas of turbulent flow in the right common and internal carotid arteries due in part to tortuosity but also likely related to underlying stenosis in the right internal carotid artery as above. 3. No hemodynamically significant stenosis in the left internal carotid artery. 4. Elevated peak systolic velocity in the right external carotid artery, suggesting underlying stenosis. 5. Patent vertebral arteries with antegrade flow. Cta Abdomen Pelvis W Contrast    Result Date: 3/14/2021  EXAMINATION: CTA OF THE ABDOMEN AND PELVIS WITH CONTRAST 3/14/2021 11:01 am TECHNIQUE: CTA of the abdomen and pelvis was performed with the administration of intravenous contrast. Multiplanar reformatted images are provided for review. MIP images are provided for review. Dose modulation, iterative reconstruction, and/or weight based adjustment of the mA/kV was utilized to reduce the radiation dose to as low as reasonably achievable. COMPARISON: None. HISTORY: ORDERING SYSTEM PROVIDED HISTORY: GIB protocol, abd pain, BRBPR TECHNOLOGIST PROVIDED HISTORY: Reason for exam:->GIB protocol, abd pain, BRBPR Decision Support Exception->Emergency Medical Condition (MA) Reason for Exam: RECTAL BLEEDING Acuity: Acute Type of Exam: Initial Relevant Medical/Surgical History: 85CC'S [id number] 804 FINDINGS: Nonvascular Lower Chest: Mild dependent atelectasis in the lower lobes. No acute infiltrate at the lung bases. Cardiomegaly is noted. Organs:  The liver, spleen, pancreas and adrenal glands are unremarkable. The gallbladder is mildly distended. There is poorly defined dependent soft tissue attenuation in the gallbladder neck, likely sludge. No biliary dilatation. No renal mass or significant hydronephrosis. GI/Bowel: Colonic diverticulosis with no acute features. Mild retained stool throughout the colon. No small bowel distension. The stomach and duodenal sweep are intact. Pelvis: No pelvic mass or free pelvic fluid. The uterus is absent. Mild distention of the urinary bladder. Peritoneum/Retroperitoneum: There is no retroperitoneal adenopathy or upper abdominal ascites. Bones/Soft Tissues: No acute osseous or soft tissue abnormality. Osteopenia with multilevel degenerative disc disease throughout the lumbar spine. There is a levoscoliosis of the lumbar spine centered at L2. VASCULAR Moderate calcified atherosclerotic plaque throughout the distal thoracic aorta and abdominal aorta. 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 Plaque in the proximal celiac artery and SMA. There is a 50% to 70% stenosis of the SMA and no significant celiac artery stenosis. The BEBE is patent. Single renal artery supplying each kidney with proximal plaque. The plaque obscures the origins with no focal high-grade stenosis appreciated. Calcified atherosclerotic plaque in the common iliac arteries and proximal femoral circulation bilaterally. No significant inflow disease. Bilateral SFA stenoses. There are branch stenoses within the left profunda femoral circulation. The internal iliac arteries are patent bilaterally with a moderate stenosis at the origin of the left internal iliac artery. Evaluation for active arterial bleeding is limited by the lack of initial noncontrast imaging and delayed imaging.   There is no evidence of active arterial bleeding. Mildly prominent arterial vessels are noted in the anterior wall of the rectum and anus. 1. No evidence of active arterial bleeding on arterial phase imaging. Mildly prominent arterial vessels in the anterior wall of the rectum and anus. 2. Moderately severe aortoiliac atherosclerotic disease. 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 No significant inflow disease. Bilateral proximal SFA stenoses. 3. 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 Heavy plaque in the proximal renal arteries limits evaluation. 4. Colonic diverticulosis with no acute features. Vl Dup Lower Extremity Venous Left    Result Date: 3/15/2021  EXAMINATION: DUPLEX VENOUS ULTRASOUND OF THE LEFT LOWER EXTREMITY 3/15/2021 8:30 pm TECHNIQUE: Duplex ultrasound using B-mode/gray scaled imaging and Doppler spectral analysis and color flow was obtained of the left lower extremity. COMPARISON: None. HISTORY: ORDERING SYSTEM PROVIDED HISTORY: leg pain TECHNOLOGIST PROVIDED HISTORY: Reason for exam:->leg pain Reason for Exam: lt leg pain Acuity: Acute Type of Exam: Initial FINDINGS: The visualized veins of the left lower extremity are patent and free of echogenic thrombus. The veins demonstrate good compressibility with normal color flow study and spectral analysis. No evidence of DVT in the left lower extremity. Mri Brain Without Contrast    Result Date: 3/29/2021  EXAMINATION: MRI OF THE BRAIN WITHOUT CONTRAST,  3/29/2021 1:24 pm TECHNIQUE: Multiplanar multisequence MRI of the brain was performed without the administration of intravenous contrast. COMPARISON: Head CT 03/28/2021, MRI brain 02/20/2009 HISTORY: ORDERING SYSTEM PROVIDED HISTORY: TIA TECHNOLOGIST PROVIDED HISTORY: Reason for exam: TIA Decision Support Exception:  Emergency Medical Condition (MA) Reason for Exam:  Facial droop extremity weakness.  FINDINGS: INTRACRANIAL STRUCTURES/VENTRICLES:  No acute infarction. No mass effect or midline shift. No acute intracranial hemorrhage. Diffuse parenchymal volume loss with enlargement of the ventricles and cerebral sulci. Periventricular, subcortical, and pontine white matter T2/FLAIR hyperintense signal.  Foci of augmented magnetic susceptibility artifact within the cerebella bilaterally. Diminished flow void within the right vertebral artery. The sellar/suprasellar regions appear unremarkable. Diminished flow void within the right vertebral artery. ORBITS: The visualized portion of the orbits demonstrate no acute abnormality. SINUSES: The visualized paranasal sinuses and mastoid air cells are well aerated. BONES/SOFT TISSUES: The bone marrow signal intensity appears normal. The soft tissues demonstrate no acute abnormality. 1. No acute intracranial abnormality. No acute infarction. 2. Parenchymal volume loss and sequela of severe chronic microvascular ischemic changes. 3. Diminished flow void within the right vertebral artery may be due to slow flow or occlusion. Consider further evaluation with CTA or MRA. 4. Again seen are chronic microhemorrhages or cavernous malformations in the cerebella bilaterally. LAB RESULTS  --------------------    No results found for this or any previous visit (from the past 24 hour(s)).       Medical problems    Patient Active Problem List:     GI bleed     TIA (transient ischemic attack)      ASSESSMENT:  ---------------------    Hypertensive encephalopathy     TIA r/o cardio carotid embolic event     Recent GI bleed from diverticulitis per Son     Cerebellar microhemorrhages-cavernous malformations-Chronic     Right carotid 50-69 % stenosis     Right vertebral artery slow flow vs occlusion     PLAN:     Restart asa pt stopped taking recently as she was on Voltaren gel for her right shoulder arm pain from a fall ( due to recent GI bleed Plavix was felt to increase the risk espescially as she stopped asa recently )     Mri brain as above     CTA head neck as above     Echo neg     B 12 folate TSH homocysteine level     Discussed dx prognosis meds side effects and above with pt and her son and answered all questions.           Electronically signed by Joe Franklni MD on 3/30/2021 at 6:23 PM

## 2021-03-30 NOTE — PROGRESS NOTES
[facility]    Hospitalist Progress Note      Name:  Jovon Pierce /Age/Sex: 1920  ([place]iki  [age] y.o. female)   MRN & CSN:  [id number] & [id number] Admission Date/Time: 3/28/2021 11:15 AM   Location:  [address] [place] [address]-A PCP: Elliott Valera MD, MD         Hospital Day: 3    Assessment and Plan:   Jovon Pierce is a [place]iki  [age] y.o.  female  past medical history of hypertension, recent GI bleed, hypothyroidism, arthritis, who presents who presents with confusion      Possible TIA      Patient presented with mild confusion, no speech issues, no weakness   Ammonia- normal, UA unremarkable, CT head in ED unremarkable,   Continue to monitor with NIH Stroke scale, Carotid doppler with ~69% stenosis of GARRY  Check MRI brain, ECHO, , A1c-5.7  Consult Neurology, PT/OT, Speech Therapy,   Continue with ASA, Lipitor      Hypertensive emergency     With still uncontrolled BP today- increase hydralazine and add prn     Transient lactic acidosis - resolved     Lactate 3.2> 1.5, no sign of infection, WBC normal, UA unremarkable, CT chest no acute issues     Weakness and debility -age-related,     Likely compounded by hospital stay -was DC on -  PT/OT, maintain fall precautions      Hypothyroidism -on Synthroid    Diet DIET GENERAL;   DVT Prophylaxis [] Lovenox, []  Heparin, [] SCDs, []No VTE prophylaxis, patient ambulating   GI Prophylaxis [] PPI, [] H2 Blocker, [] No GI prophylaxis, patient is receiving diet/Tube Feeds   Code Status Full Code   Disposition Patient requires continued admission due to TIA   MDM [] Low, [x] Moderate,[]  High     History of Present Illness: Subjective     Awake, interactive, with still uncontrolled BP as SBP in 200s. Ten point ROS reviewed negative, unless as noted above    Objective:        Intake/Output Summary (Last 24 hours) at 3/30/2021 1402  Last data filed at 3/30/2021 1230  Gross per 24 hour   Intake 600 ml   Output    Net 600 ml      Vitals:   Vitals:    21 1345   BP: (!) 248/82   Pulse: 82   Resp: 12   Temp:    SpO2:      Physical Exam:    GEN Awake female, resting in bed in no apparent distress. Appears given age. HENT Mucous membranes are moist.   RESP Clear to auscultation, no wheezes, rales or rhonchi. CARDIO/VASC -S1/S2 auscultated. Regular rate without appreciable murmurs, rubs, or gallops. Peripheral pulses equal bilaterally and palpable. No peripheral edema. GI Abdomen is soft without significant tenderness, masses, or guarding. Bowel sounds are normoactive. Rectal exam deferred.  Lundberg catheter is not present. NEURO Legally blind, all other cranial nerves appear grossly intact, normal speech, no lateralizing weakness.     Medications:   Medications:    enoxaparin  30 mg Subcutaneous Daily    hydrALAZINE  25 mg Oral 3 times per day    vitamin C  1,000 mg Oral Daily    dilTIAZem  240 mg Oral Daily    gabapentin  300 mg Oral Nightly    levothyroxine  75 mcg Oral Daily    losartan  100 mg Oral Daily    sodium chloride flush  10 mL Intravenous 2 times per day    aspirin  81 mg Oral Daily    Or    aspirin  300 mg Rectal Daily    rosuvastatin  40 mg Oral Nightly    magnesium gluconate  500 mg Oral Daily    vitamin D  2,000 Units Oral Nightly      Infusions:    sodium chloride       PRN Meds: sodium chloride flush, 10 mL, PRN  sodium chloride, 25 mL, PRN  promethazine, 12.5 mg, Q6H PRN    Or  ondansetron, 4 mg, Q6H PRN  polyethylene glycol, 17 g, Daily PRN  traMADol, 25 mg, Q6H PRN  acetaminophen, 650 mg, Q4H PRN          Electronically signed by Rosa Rucker MD on 3/30/2021 at 2:02 PM

## 2021-03-31 LAB
ALBUMIN SERPL-MCNC: 3.6 GM/DL (ref 3.4–5)
ANION GAP SERPL CALCULATED.3IONS-SCNC: 9 MMOL/L (ref 4–16)
BUN BLDV-MCNC: 20 MG/DL (ref 6–23)
CALCIUM SERPL-MCNC: 8.3 MG/DL (ref 8.3–10.6)
CHLORIDE BLD-SCNC: 105 MMOL/L (ref 99–110)
CO2: 24 MMOL/L (ref 21–32)
CREAT SERPL-MCNC: 0.9 MG/DL (ref 0.6–1.1)
GFR AFRICAN AMERICAN: >60 ML/MIN/1.73M2
GFR NON-AFRICAN AMERICAN: 58 ML/MIN/1.73M2
GLUCOSE BLD-MCNC: 117 MG/DL (ref 70–99)
PHOSPHORUS: 3.4 MG/DL (ref 2.5–4.9)
POTASSIUM SERPL-SCNC: 3.9 MMOL/L (ref 3.5–5.1)
SODIUM BLD-SCNC: 138 MMOL/L (ref 135–145)

## 2021-03-31 PROCEDURE — 6370000000 HC RX 637 (ALT 250 FOR IP): Performed by: INTERNAL MEDICINE

## 2021-03-31 PROCEDURE — 94761 N-INVAS EAR/PLS OXIMETRY MLT: CPT

## 2021-03-31 PROCEDURE — 80069 RENAL FUNCTION PANEL: CPT

## 2021-03-31 PROCEDURE — 1200000000 HC SEMI PRIVATE

## 2021-03-31 PROCEDURE — 6370000000 HC RX 637 (ALT 250 FOR IP): Performed by: NURSE PRACTITIONER

## 2021-03-31 PROCEDURE — 6360000002 HC RX W HCPCS: Performed by: INTERNAL MEDICINE

## 2021-03-31 PROCEDURE — 6370000000 HC RX 637 (ALT 250 FOR IP): Performed by: HOSPITALIST

## 2021-03-31 PROCEDURE — 36415 COLL VENOUS BLD VENIPUNCTURE: CPT

## 2021-03-31 RX ORDER — HYDRALAZINE HYDROCHLORIDE 50 MG/1
50 TABLET, FILM COATED ORAL ONCE
Status: COMPLETED | OUTPATIENT
Start: 2021-03-31 | End: 2021-03-31

## 2021-03-31 RX ADMIN — LEVOTHYROXINE SODIUM 75 MCG: 0.07 TABLET ORAL at 05:49

## 2021-03-31 RX ADMIN — DILTIAZEM HYDROCHLORIDE 240 MG: 240 CAPSULE, EXTENDED RELEASE ORAL at 09:59

## 2021-03-31 RX ADMIN — HYDRALAZINE HYDROCHLORIDE 50 MG: 50 TABLET ORAL at 05:49

## 2021-03-31 RX ADMIN — HYDRALAZINE HYDROCHLORIDE 50 MG: 50 TABLET ORAL at 12:54

## 2021-03-31 RX ADMIN — ENOXAPARIN SODIUM 30 MG: 30 INJECTION SUBCUTANEOUS at 09:59

## 2021-03-31 RX ADMIN — ACETAMINOPHEN 650 MG: 325 TABLET ORAL at 22:00

## 2021-03-31 RX ADMIN — Medication 2000 UNITS: at 22:01

## 2021-03-31 RX ADMIN — HYDRALAZINE HYDROCHLORIDE 50 MG: 50 TABLET ORAL at 17:59

## 2021-03-31 RX ADMIN — HYDRALAZINE HYDROCHLORIDE 75 MG: 50 TABLET ORAL at 22:00

## 2021-03-31 RX ADMIN — ROSUVASTATIN CALCIUM 40 MG: 20 TABLET, COATED ORAL at 22:00

## 2021-03-31 RX ADMIN — LOSARTAN POTASSIUM 100 MG: 100 TABLET, FILM COATED ORAL at 09:59

## 2021-03-31 RX ADMIN — OXYCODONE HYDROCHLORIDE AND ACETAMINOPHEN 1000 MG: 500 TABLET ORAL at 09:59

## 2021-03-31 RX ADMIN — ASPIRIN 81 MG: 81 TABLET, COATED ORAL at 09:59

## 2021-03-31 ASSESSMENT — PAIN SCALES - GENERAL: PAINLEVEL_OUTOF10: 3

## 2021-03-31 ASSESSMENT — PAIN DESCRIPTION - LOCATION: LOCATION: GENERALIZED

## 2021-03-31 ASSESSMENT — PAIN DESCRIPTION - DESCRIPTORS: DESCRIPTORS: ACHING

## 2021-03-31 ASSESSMENT — PAIN DESCRIPTION - PAIN TYPE: TYPE: CHRONIC PAIN

## 2021-03-31 NOTE — PROGRESS NOTES
NEUROLOGY NOTE  DR. Yari Hill MD.  -------------------------------------------------  Subjective:    Pt is doing better today    Denies any new symptoms      Doing better. Denies any new symptoms. Denies headache nausea vomiting dizziness    Denies numbness weakness extremities    Denies blurring of vision double vision    Objective:    BP (!) 192/70   Pulse 80   Temp 97.3 °F (36.3 °C) (Oral)   Resp 16   Ht 4' 9\" (1.448 m)   Wt 135 lb 5 oz (61.4 kg)   SpO2 99%   BMI 29.28 kg/m²   HEENT nl      Neuro exam    Alert Oriented  X 3  Follow simple commands  EOMI Pupils 3 mm ellie  5/5 all 4 extremities      RADIOLOGY  -----------------    Echocardiogram Complete 2d With Doppler With Color    Result Date: 3/30/2021  Transthoracic Echocardiography Report (TTE)  Demographics   Patient Name      NAVID COLON V        Date of Study       2021   Date of Birth     1920          Gender              Female   Age               Chiki Barbara [age] year(s)         Race                   Patient Number    [id number]          Room Number         7785   Visit Number      [id number]   Corporate ID      [id number]   Accession Number  [id number]          James Barrett MD  Interpreting        Ramón Kinney  Physician                             Physician           Jai LAST  Procedure Type of Study   TTE procedure:ECHOCARDIOGRAM COMPLETE 2D W DOPPLER W COLOR. Procedure Date Date: 2021 Start: 09:04 AM Study Location: Portable Technical Quality: Limited visualization due to patient immobility. Indications:TIA. Patient Status: Routine HR: 80 bpm BP: 222/64 mmHg  Conclusions   Summary  Technically difficult examination. Left ventricular systolic function is normal.  Ejection fraction is visually estimated at 50-55%. Indeterminate diastolic function; E/A flow reversal is noted.   Mild to moderate aortic regurgitation ( ms). No evidence of any pericardial effusion. Patient refused agitated saline bubble study. Signature   ------------------------------------------------------------------  Electronically signed by La Conti MD  (Interpreting physician) on 03/30/2021 at 12:49 PM  ------------------------------------------------------------------   Findings   Left Ventricle  Left ventricular systolic function is normal.  Ejection fraction is visually estimated at 50-55%. Indeterminate diastolic function; E/A flow reversal is noted. Left Atrium  Essentially normal left atrium. Right Atrium  Essentially normal right atrium. Right Ventricle  Essentially normal right ventricle. Aortic Valve  Mild to moderate aortic regurgitation ( ms). Mitral Valve  Structurally normal mitral valve. Tricuspid Valve  Trace tricuspid regurgitation is present. Pulmonic Valve  The pulmonic valve was not well visualized. Pericardial Effusion  No evidence of any pericardial effusion. Miscellaneous  Patient refused agitated saline bubble study.   M-Mode/2D Measurements & Calculations   LV Diastolic Dimension:  LV Systolic Dimension:  LA Dimension: 3 cmAO Root  3.46 cm                  1.93 cm                 Dimension: 2.9 cmLA Area:  LV FS:44.2 %             LV Volume Diastolic: 51 18.9 cm2  LV PW Diastolic: 1.83 cm ml  LV PW Systolic: 1.96 cm  LV Volume Systolic: 16  Septum Diastolic: 5.99   ml  cm                       LV EDV/LV EDV Index: 51 RV Diastolic Dimension:  Septum Systolic: 1.10 cm mlLV ESV/LV ESV Index:  2.21 cm  CO: 4.1 l/min            16 ml                           EF Calculated (A4C):    LA/Aorta: 1.03                           68.6 %  LV Area Diastolic: 69.1  EF Calculated (2D):     LA volume/Index: 50 ml  cm2                      76.6 %  LV Area Systolic: 11 cm2                           LV Length: 7.11 cm                            LVOT: 1.9 cm  Doppler Measurements & Calculations   MV Peak E-Wave: 74.7    AV Peak Velocity: 184 cm/s   LVOT Peak Velocity:  cm/s                    AV Peak Gradient: 13.54 mmHg 87.4 cm/s  MV Peak A-Wave: 148     AV Mean Velocity: 116 cm/s   LVOT Mean Velocity:  cm/s                    AV Mean Gradient: 7 mmHg     65.3 cm/s  MV E/A Ratio: 0.5       AV VTI: 33.4 cm              LVOT Peak Gradient: 3  MV Peak Gradient: 2.23  AV Area (Continuity):1.54    mmHgLVOT Mean Gradient:  mmHg                    cm2                          2 mmHg   MV P1/2t: 38 msec       LVOT VTI: 18.1 cm  MVA by PHT:5.79 cm2     AV P1/2t: 416 msec   MV E' Septal Velocity:  6.47 cm/s  MV E' Lateral Velocity:  7.13 cm/s  MV E/E' septal: 11.55  MV E/E' lateral: 10.48      Ct Abdomen Pelvis Wo Contrast Additional Contrast? None    Result Date: 3/28/2021  EXAMINATION: CT OF THE ABDOMEN AND PELVIS WITHOUT CONTRAST 3/28/2021 1:16 pm TECHNIQUE: CT of the abdomen and pelvis was performed without the administration of intravenous contrast. Multiplanar reformatted images are provided for review. Dose modulation, iterative reconstruction, and/or weight based adjustment of the mA/kV was utilized to reduce the radiation dose to as low as reasonably achievable. COMPARISON: CT abdomen and pelvis 03/14/2021 HISTORY: ORDERING SYSTEM PROVIDED HISTORY: altered TECHNOLOGIST PROVIDED HISTORY: Reason for exam:->altered Additional Contrast?->None Decision Support Exception->Emergency Medical Condition (MA) Reason for Exam: ABD PAIN Acuity: Acute Type of Exam: Subsequent/Follow-up Relevant Medical/Surgical History: CTA ABD 2 WEEKS AGO FINDINGS: Lower Chest: Correlate with dictated report of CT chest performed at the same time as this exam. Organs: The liver attenuation and texture appears unremarkable. No discrete hepatic lesion or intrahepatic bile duct dilatation is seen.   The gallbladder has calcifications reflecting cholelithiasis without pericholecystic fluid or inflammatory change evident. The kidneys, spleen, adrenal glands and pancreas appear unremarkable. GI/Bowel: Multifocal diverticula involve the descending and sigmoid colon without evidence of acute inflammatory change. No diffuse or focal bowel wall thickening evident. No inflammatory changes evident. No obstruction is seen. The appendix is not clearly visualized. Pelvis: Hysterectomy. Partially filled urinary bladder appears unremarkable. No pelvic adenopathy or free fluid is seen. Vascular calcifications are noted reflecting calcific atherosclerosis. No pneumoperitoneum. Peritoneum/Retroperitoneum: Calcific atherosclerotic disease aorta. No aneurysm. Unremarkable appearance of the IVC. No adenopathy or fluid. No pneumoperitoneum. Bones/Soft Tissues: Degenerative changes throughout the lumbar spine with chronic grade 2 degenerative anterolisthesis L5 on S1. No acute superficial soft tissue or osseous structure abnormality evident. 1. Cholelithiasis without CT findings of acute cholecystitis. 2.  No CT evidence of an acute intra-abdominal or intrapelvic process. 3.  Calcific atherosclerotic disease aorta. 4.  Diverticulosis coli without CT evidence of acute diverticulitis. Ct Head Wo Contrast    Result Date: 3/28/2021  EXAMINATION: CT OF THE HEAD WITHOUT CONTRAST  3/28/2021 12:05 pm TECHNIQUE: CT of the head was performed without the administration of intravenous contrast. Dose modulation, iterative reconstruction, and/or weight based adjustment of the mA/kV was utilized to reduce the radiation dose to as low as reasonably achievable. COMPARISON: 09/14/2009. HISTORY: ORDERING SYSTEM PROVIDED HISTORY: altered TECHNOLOGIST PROVIDED HISTORY: Has a \"code stroke\" or \"stroke alert\" been called? ->No Reason for exam:->altered Decision Support Exception->Emergency Medical Condition (MA) Reason for Exam: altered Acuity: Acute Type of Exam: Initial FINDINGS: BRAIN/VENTRICLES: There is no acute intracranial hemorrhage, mass effect or midline shift. No abnormal extra-axial fluid collection. The gray-white differentiation is maintained without evidence of an acute infarct. There is no evidence of hydrocephalus. Moderate to severe chronic microvascular ischemic change. ORBITS: The visualized portion of the orbits demonstrate no acute abnormality. SINUSES: The visualized paranasal sinuses and mastoid air cells demonstrate no acute abnormality. SOFT TISSUES/SKULL:  No acute abnormality of the visualized skull or soft tissues. No acute intracranial abnormality. Ct Chest Wo Contrast    Result Date: 3/28/2021  EXAMINATION: CT OF THE CHEST WITHOUT CONTRAST 3/28/2021 1:10 pm TECHNIQUE: CT of the chest was performed without the administration of intravenous contrast. Multiplanar reformatted images are provided for review. Dose modulation, iterative reconstruction, and/or weight based adjustment of the mA/kV was utilized to reduce the radiation dose to as low as reasonably achievable. COMPARISON: None. HISTORY: ORDERING SYSTEM PROVIDED HISTORY: altered possible pneumothorax TECHNOLOGIST PROVIDED HISTORY: Reason for exam:->altered possible pneumothorax Decision Support Exception->Emergency Medical Condition (MA) Reason for Exam: POSS RT PNEUMOTHORAX  SEEN ON XRAY Acuity: Acute Type of Exam: Initial FINDINGS: Mediastinum: Cardiac structures and great vessels appear unremarkable with exception of calcific atherosclerotic disease. No pericardial effusion. Posterior mediastinal structures appear unremarkable. No mediastinal or hilar adenopathy. Lungs/pleura: Well-expanded and clear. Minimal senescent pulmonary changes. No discrete pulmonary nodule or consolidation. No inspissated secretions or endobronchial lesion evident. Upper Abdomen: Unremarkable. Correlate with dictated report of CT abdomen/pelvis performed at the same time. Soft Tissues/Bones:     1. No pneumothorax. 2. Mild senescent pulmonary changes.   No acute pulmonary disease. 3.  Vascular calcifications are noted reflecting calcific atherosclerosis. Xr Chest Portable    Result Date: 3/28/2021  EXAMINATION: ONE XRAY VIEW OF THE CHEST 3/28/2021 11:27 am COMPARISON: 03/14/2021. HISTORY: ORDERING SYSTEM PROVIDED HISTORY: fatigue TECHNOLOGIST PROVIDED HISTORY: Reason for exam:->fatigue Reason for Exam: Extremity Weakness; Facial Droop; Altered Mental Status FINDINGS: The lungs are without acute focal process. No pleural effusion. Possible small right apical pneumothorax. .  The cardiomediastinal silhouette is without acute process. The osseous structures are without acute process. Possible small right apical pneumothorax. Xr Chest Portable    Result Date: 3/14/2021  EXAMINATION: ONE XRAY VIEW OF THE CHEST 3/14/2021 10:38 am COMPARISON: 02/24/2009 HISTORY: Mild hypoxia. FINDINGS: Heart is not enlarged. Severe atherosclerotic calcification of the aortic arch. No acute airspace disease, pleural effusion, pneumothorax. Osteopenia. No acute abnormality. Vl Dup Carotid Bilateral    Result Date: 3/29/2021  EXAMINATION: ULTRASOUND EVALUATION OF THE CAROTID ARTERIES 3/29/2021 5:37 am COMPARISON: Cervical spine CT 09/14/2009 HISTORY: ORDERING SYSTEM PROVIDED HISTORY: TIA TECHNOLOGIST PROVIDED HISTORY: Reason for exam:->TIA Reason for Exam: Dizziness Acuity: Acute Type of Exam: Ongoing FINDINGS: Patent common, internal, and external carotid arteries and vertebral arteries with antegrade flow. Tortuous carotid arteries especially on the right. Areas of turbulence in both the right common and internal carotid arteries. Suspected mild predominantly hard plaque in the right carotid bulb. Minimal to mild mixed plaque near the origin of the left internal carotid artery.  Peak systolic velocities as below: RIGHT Common carotid (proximal):  142 cm/s Common carotid (distal):  128 cm/s Internal carotid (proximal):  124 cm/s Internal carotid (mid):  107 cm/s Internal carotid (distal):  220 cm/s External carotid (proximal):  175 cm/s Vertebral:  42 cm/s ICA/CCA ratio: 1.5 LEFT Common carotid (proximal):  116 cm/s Common carotid (distal):  97 cm/s Internal carotid (proximal):  69 cm/s Internal carotid (mid):  77 cm/s Internal carotid (distal):  92 cm/s External carotid (proximal):  87 cm/s Vertebral:  79 cm/s ICA/CCA ratio:  0.8     1. 50-69% stenosis in the distal right internal carotid artery, likely closer to 69%. 2. Areas of turbulent flow in the right common and internal carotid arteries due in part to tortuosity but also likely related to underlying stenosis in the right internal carotid artery as above. 3. No hemodynamically significant stenosis in the left internal carotid artery. 4. Elevated peak systolic velocity in the right external carotid artery, suggesting underlying stenosis. 5. Patent vertebral arteries with antegrade flow. Cta Abdomen Pelvis W Contrast    Result Date: 3/14/2021  EXAMINATION: CTA OF THE ABDOMEN AND PELVIS WITH CONTRAST 3/14/2021 11:01 am TECHNIQUE: CTA of the abdomen and pelvis was performed with the administration of intravenous contrast. Multiplanar reformatted images are provided for review. MIP images are provided for review. Dose modulation, iterative reconstruction, and/or weight based adjustment of the mA/kV was utilized to reduce the radiation dose to as low as reasonably achievable. COMPARISON: None. HISTORY: ORDERING SYSTEM PROVIDED HISTORY: GIB protocol, abd pain, BRBPR TECHNOLOGIST PROVIDED HISTORY: Reason for exam:->GIB protocol, abd pain, BRBPR Decision Support Exception->Emergency Medical Condition (MA) Reason for Exam: RECTAL BLEEDING Acuity: Acute Type of Exam: Initial Relevant Medical/Surgical History: [facility] FINDINGS: Nonvascular Lower Chest: Mild dependent atelectasis in the lower lobes. No acute infiltrate at the lung bases. Cardiomegaly is noted. Organs:  The liver, spleen, pancreas and adrenal glands are unremarkable. The gallbladder is mildly distended. There is poorly defined dependent soft tissue attenuation in the gallbladder neck, likely sludge. No biliary dilatation. No renal mass or significant hydronephrosis. GI/Bowel: Colonic diverticulosis with no acute features. Mild retained stool throughout the colon. No small bowel distension. The stomach and duodenal sweep are intact. Pelvis: No pelvic mass or free pelvic fluid. The uterus is absent. Mild distention of the urinary bladder. Peritoneum/Retroperitoneum: There is no retroperitoneal adenopathy or upper abdominal ascites. Bones/Soft Tissues: No acute osseous or soft tissue abnormality. Osteopenia with multilevel degenerative disc disease throughout the lumbar spine. There is a levoscoliosis of the lumbar spine centered at L2. VASCULAR Moderate calcified atherosclerotic plaque throughout the distal thoracic aorta and abdominal aorta. 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 Plaque in the proximal celiac artery and SMA. There is a 50% to 70% stenosis of the SMA and no significant celiac artery stenosis. The BEBE is patent. Single renal artery supplying each kidney with proximal plaque. The plaque obscures the origins with no focal high-grade stenosis appreciated. Calcified atherosclerotic plaque in the common iliac arteries and proximal femoral circulation bilaterally. No significant inflow disease. Bilateral SFA stenoses. There are branch stenoses within the left profunda femoral circulation. The internal iliac arteries are patent bilaterally with a moderate stenosis at the origin of the left internal iliac artery. Evaluation for active arterial bleeding is limited by the lack of initial noncontrast imaging and delayed imaging.   There is no evidence of active infarction. No mass effect or midline shift. No acute intracranial hemorrhage. Diffuse parenchymal volume loss with enlargement of the ventricles and cerebral sulci. Periventricular, subcortical, and pontine white matter T2/FLAIR hyperintense signal.  Foci of augmented magnetic susceptibility artifact within the cerebella bilaterally. Diminished flow void within the right vertebral artery. The sellar/suprasellar regions appear unremarkable. Diminished flow void within the right vertebral artery. ORBITS: The visualized portion of the orbits demonstrate no acute abnormality. SINUSES: The visualized paranasal sinuses and mastoid air cells are well aerated. BONES/SOFT TISSUES: The bone marrow signal intensity appears normal. The soft tissues demonstrate no acute abnormality. 1. No acute intracranial abnormality. No acute infarction. 2. Parenchymal volume loss and sequela of severe chronic microvascular ischemic changes. 3. Diminished flow void within the right vertebral artery may be due to slow flow or occlusion. Consider further evaluation with CTA or MRA. 4. Again seen are chronic microhemorrhages or cavernous malformations in the cerebella bilaterally.        LAB RESULTS  --------------------    Recent Results (from the past 24 hour(s))   Renal function panel    Collection Time: 03/31/21  3:23 PM   Result Value Ref Range    Sodium 138 135 - 145 MMOL/L    Potassium 3.9 3.5 - 5.1 MMOL/L    Chloride 105 99 - 110 mMol/L    CO2 24 21 - 32 MMOL/L    Anion Gap 9 4 - 16    BUN 20 6 - 23 MG/DL    CREATININE 0.9 0.6 - 1.1 MG/DL    Glucose 117 (H) 70 - 99 MG/DL    Calcium 8.3 8.3 - 10.6 MG/DL    GFR Non- 58 (L) >60 mL/min/1.73m2    GFR African American >60 >60 mL/min/1.73m2    Albumin 3.6 3.4 - 5.0 GM/DL    Phosphorus 3.4 2.5 - 4.9 MG/DL         Medical problems    Patient Active Problem List:     GI bleed     TIA (transient ischemic attack)      ASSESSMENT:  ---------------------    Hypertensive encephalopathy     TIA r/o cardio carotid embolic event     Recent GI bleed from diverticulitis per Son     Cerebellar microhemorrhages-cavernous malformations-Chronic     Right carotid 50-69 % stenosis     Right vertebral artery slow flow vs occlusion     PLAN:     Restart asa pt stopped taking recently as she was on Voltaren gel for her right shoulder arm pain from a fall ( due to recent GI bleed Plavix was felt to increase the risk espescially as she stopped asa recently )     Mri brain as above     CTA head neck as above     Echo neg     B 12 folate TSH homocysteine level     Discussed dx prognosis meds side effects and above with pt and her son and answered all questions.           Electronically signed by Nolan Tucker MD on 3/31/2021 at 7:45 PM

## 2021-03-31 NOTE — CARE COORDINATION
LSW called [facility] to check on referral.  LSW spoke with Roxana Lizarraga with  and she stated they can take pt tomorrow. Pt will need a COVID results. Roxana Lizarraga informed this LSW that she wants family to understanding that pt will be in a building that has dementia pt and some with MH. She stated it can get noisy. Also their vistation policy is scheduled in advance. Pt and visitor will be int he dining room together and it is only for 30 minutes. LSW informed pt and her son regarding [facility] agreed to [facility] pt. LSW informed them of the possible noise and visitation policy. Pt's son requested that this LSW call [facility] and ask if they have any availability. LSW called and spoke with Ganesh Gonzáles. Ganesh Gonzáles stated they have beds available and their policy for vistation is M-F and it has to be scheduled. LSW informed pt and son and they requested LSW go ahead and give referral and and pt information to [facility]. LSW right faxed pt information to the NH. Will call later to see if they have a decision to take pt.

## 2021-03-31 NOTE — PROGRESS NOTES
[address] [place] [address]  HOSPITALIST PROGRESS NOTE                       Name:  Vianca Faust /Age/Sex: 1920  (Chiki  [age] y.o. female)   MRN & CSN:  [id number] & [id number] Admission Date/Time: 3/28/2021 11:15 AM   Location:  [address] [place] [address]-A Attending:  Amina Brewer MD                                                  HPI  Vianca Faust is a Chiki Barbara [age] y.o. female who presents with concerns of TIA    SUBJECTIVE  -hard of hearing but does not appear in any distress, BP continues to be uncontrolled    ROS- no chest pain/shortness of breath, no abdominal pain/nausea. ALLERGIES: No Known Allergies    PCP: Dilcia Suarez MD, MD    PAST MEDICAL HISTORY, SURGICAL HISTORY, SOCIAL HISTORY and  HOME MEDICATIONS all reviewed. OBJECTIVE  Vitals:    21 1628 21 1800 21 0545   BP:  (!) 171/72 (!) 183/85 (!) 176/66   Pulse: 86 73 77 80   Resp:    Temp:   98.2 °F (36.8 °C) 98 °F (36.7 °C)   TempSrc:   Oral Oral   SpO2:   96%    Weight:    135 lb 5 oz (61.4 kg)   Height:           PHYSICAL EXAM   GEN Awake female, sitting upright in bed in no apparent distress  EYES Pupils are equally round. No scleral erythema, discharge, or conjunctivitis. HENT Mucous membranes are moist. Oral pharynx without exudates, no evidence of thrush. NECK Supple, no apparent thyromegaly or masses. RESP Clear to auscultation, no wheezes, rales or rhonchi. Symmetric chest movement while on room air. CARDIO/VASC S1/S2 auscultated. Regular rate without appreciable murmurs, rubs, or gallops. No JVD or carotid bruits. Peripheral pulses equal bilaterally and palpable. No peripheral edema. GI Abdomen is soft without significant tenderness, masses, or guarding. Bowel sounds are normoactive. Rectal exam deferred.  No costovertebral angle tenderness. Normal appearing external genitalia. HEME/LYMPH No palpable cervical lymphadenopathy and no hepatosplenomegaly. No petechiae or ecchymoses.   MSK Spontaneous movement of all extremities. No gross joint deformities. SKIN Normal coloration, warm, dry. NEURO awake, interactive, moving extremities    INTAKE: In: 120 [P.O.:120]  Out: -   OUTPUT: In: 120   Out: -     LABS  Recent Labs     03/28/21  1139 03/29/21  0612   WBC 6.4 6.0   HGB 12.8 12.0*   HCT 39.3 38.1    227      Recent Labs     03/28/21  1139      K 4.7      CO2 23   BUN 16   CREATININE 1.0     Recent Labs     03/28/21  1139   AST 24   ALT 16   BILITOT 0.3   ALKPHOS 57     No results for input(s): INR in the last 72 hours. Recent Labs     03/28/21  1139   TROPONINT <0.010          Abnormal labs for today noted      Imaging:     ECHO:    Microbiology:  Blood culture:    Urine culture:    Sputum culture:    Procedures done this admission:    MEDS  Scheduled Meds:   influenza virus vaccine  0.5 mL Intramuscular Prior to discharge    hydrALAZINE  50 mg Oral 3 times per day    enoxaparin  30 mg Subcutaneous Daily    vitamin C  1,000 mg Oral Daily    dilTIAZem  240 mg Oral Daily    gabapentin  300 mg Oral Nightly    levothyroxine  75 mcg Oral Daily    losartan  100 mg Oral Daily    sodium chloride flush  10 mL Intravenous 2 times per day    aspirin  81 mg Oral Daily    Or    aspirin  300 mg Rectal Daily    rosuvastatin  40 mg Oral Nightly    magnesium gluconate  500 mg Oral Daily    vitamin D  2,000 Units Oral Nightly     Continuous Infusions:   sodium chloride       PRN Meds:hydrALAZINE (APRESOLINE) ivpb, sodium chloride flush, sodium chloride, promethazine **OR** ondansetron, polyethylene glycol, traMADol, acetaminophen        ASSESSMENT and PLAN  Hospital Day: 4     1-Possible TIA - presented with mild confusion, no speech issues, no weakness   -imaging including MRI/CT head with no acute CVA- echo with no thrombus and preserved EF- on ASA per neuro.  ?due HTN crisis  2-Hypertensive crisis- SBP still remains uncontrolled- on olmesartan/cardizem as an outpatient- hydralazine added- along with prn  3-Transient lactic acidosis - resolved   4-Weakness and debility -age-related, awaiting placement  5-Hypothyroidism -on Synthroid       Disp:     Diet DIET GENERAL;   DVT Prophylaxis [] Lovenox, []  Heparin, [] SCDs, [] Ambulation   GI Prophylaxis [] PPI,  [] H2 Blocker,  [] Carafate,  [] Diet/Tube Feeds   Code Status Full Code   Disposition Patient requires continued admission due to debility   CMS Level of Risk [] Low, [x] Moderate,[]  High  Patient's risk as above due to debility     EBER SENIOR MD 3/31/2021 9:34 AM

## 2021-04-01 LAB
FOLATE: >20 NG/ML (ref 3.1–17.5)
HOMOCYSTEINE: 8.6 UMOL/L (ref 0–10)
T4 FREE: 1.37 NG/DL (ref 0.9–1.8)
TSH HIGH SENSITIVITY: 6 UIU/ML (ref 0.27–4.2)
VITAMIN B-12: 536 PG/ML (ref 211–911)

## 2021-04-01 PROCEDURE — 82746 ASSAY OF FOLIC ACID SERUM: CPT

## 2021-04-01 PROCEDURE — 6360000002 HC RX W HCPCS: Performed by: INTERNAL MEDICINE

## 2021-04-01 PROCEDURE — 36415 COLL VENOUS BLD VENIPUNCTURE: CPT

## 2021-04-01 PROCEDURE — 84439 ASSAY OF FREE THYROXINE: CPT

## 2021-04-01 PROCEDURE — 82607 VITAMIN B-12: CPT

## 2021-04-01 PROCEDURE — 6370000000 HC RX 637 (ALT 250 FOR IP): Performed by: HOSPITALIST

## 2021-04-01 PROCEDURE — 1200000000 HC SEMI PRIVATE

## 2021-04-01 PROCEDURE — 84443 ASSAY THYROID STIM HORMONE: CPT

## 2021-04-01 PROCEDURE — 94761 N-INVAS EAR/PLS OXIMETRY MLT: CPT

## 2021-04-01 PROCEDURE — 6370000000 HC RX 637 (ALT 250 FOR IP): Performed by: INTERNAL MEDICINE

## 2021-04-01 PROCEDURE — 83090 ASSAY OF HOMOCYSTEINE: CPT

## 2021-04-01 PROCEDURE — 86800 THYROGLOBULIN ANTIBODY: CPT

## 2021-04-01 RX ORDER — AMLODIPINE BESYLATE 5 MG/1
5 TABLET ORAL DAILY PRN
Status: DISCONTINUED | OUTPATIENT
Start: 2021-04-01 | End: 2021-04-02 | Stop reason: HOSPADM

## 2021-04-01 RX ORDER — AMLODIPINE BESYLATE 5 MG/1
5 TABLET ORAL DAILY
Status: DISCONTINUED | OUTPATIENT
Start: 2021-04-01 | End: 2021-04-02 | Stop reason: HOSPADM

## 2021-04-01 RX ORDER — METOPROLOL TARTRATE 50 MG/1
50 TABLET, FILM COATED ORAL 2 TIMES DAILY
Status: DISCONTINUED | OUTPATIENT
Start: 2021-04-01 | End: 2021-04-02 | Stop reason: HOSPADM

## 2021-04-01 RX ADMIN — HYDRALAZINE HYDROCHLORIDE 75 MG: 50 TABLET ORAL at 15:12

## 2021-04-01 RX ADMIN — LOSARTAN POTASSIUM 100 MG: 100 TABLET, FILM COATED ORAL at 08:58

## 2021-04-01 RX ADMIN — ENOXAPARIN SODIUM 30 MG: 30 INJECTION SUBCUTANEOUS at 08:58

## 2021-04-01 RX ADMIN — AMLODIPINE BESYLATE 5 MG: 5 TABLET ORAL at 11:05

## 2021-04-01 RX ADMIN — METOPROLOL TARTRATE 50 MG: 50 TABLET, FILM COATED ORAL at 11:04

## 2021-04-01 RX ADMIN — ROSUVASTATIN CALCIUM 40 MG: 20 TABLET, COATED ORAL at 21:06

## 2021-04-01 RX ADMIN — OXYCODONE HYDROCHLORIDE AND ACETAMINOPHEN 1000 MG: 500 TABLET ORAL at 08:58

## 2021-04-01 RX ADMIN — LEVOTHYROXINE SODIUM 75 MCG: 0.07 TABLET ORAL at 07:00

## 2021-04-01 RX ADMIN — DILTIAZEM HYDROCHLORIDE 240 MG: 240 CAPSULE, EXTENDED RELEASE ORAL at 08:58

## 2021-04-01 RX ADMIN — AMLODIPINE BESYLATE 5 MG: 5 TABLET ORAL at 18:20

## 2021-04-01 RX ADMIN — HYDRALAZINE HYDROCHLORIDE 75 MG: 50 TABLET ORAL at 07:00

## 2021-04-01 RX ADMIN — ASPIRIN 81 MG: 81 TABLET, COATED ORAL at 08:57

## 2021-04-01 RX ADMIN — Medication 2000 UNITS: at 21:05

## 2021-04-01 RX ADMIN — METOPROLOL TARTRATE 50 MG: 50 TABLET, FILM COATED ORAL at 21:06

## 2021-04-01 RX ADMIN — HYDRALAZINE HYDROCHLORIDE 75 MG: 50 TABLET ORAL at 21:06

## 2021-04-01 ASSESSMENT — PAIN SCALES - GENERAL
PAINLEVEL_OUTOF10: 0
PAINLEVEL_OUTOF10: 0
PAINLEVEL_OUTOF10: 5

## 2021-04-01 ASSESSMENT — PAIN DESCRIPTION - PAIN TYPE: TYPE: ACUTE PAIN

## 2021-04-01 ASSESSMENT — PAIN DESCRIPTION - LOCATION: LOCATION: GENERALIZED;BACK

## 2021-04-01 NOTE — PROGRESS NOTES
[address] [place] [address]  HOSPITALIST PROGRESS NOTE                       Name:  Mirella Menjivar /Age/Sex: 1920  (Chiki  [age] y.o. female)   MRN & CSN:  [id number] & [id number] Admission Date/Time: 3/28/2021 11:15 AM   Location:  [address] [place], [place] [id number] Attending:  Bree Sylvester MD                                                  HPI  Mirella Menjivar is a Chiki Barbara [age] y.o. female who presents with concerns of TIA    SUBJECTIVE  BP still remains uncontrolled, refused IV yesterday. Checked BP manually and its still high    ROS- no chest pain/shortness of breath, no abdominal pain/nausea. ALLERGIES: No Known Allergies    PCP: Girish Whitney MD, MD      PAST MEDICAL HISTORY, SURGICAL HISTORY, SOCIAL HISTORY and  HOME MEDICATIONS all reviewed. OBJECTIVE  Vitals:    21 0421 21 0701 21 0849 21 0923   BP: (!) 174/79 (!) 194/68  (!) 190/90   Pulse: 85 79 84    Resp: 17 16 16    Temp: 97.5 °F (36.4 °C)  98 °F (36.7 °C)    TempSrc:   Oral    SpO2: 95%  95%    Weight:       Height:           PHYSICAL EXAM   GEN Awake female, sitting upright in bed in no apparent distress  EYES Pupils are equally round. No scleral erythema, discharge, or conjunctivitis. HENT Mucous membranes are moist. Oral pharynx without exudates, no evidence of thrush. NECK Supple, no apparent thyromegaly or masses. RESP Clear to auscultation, no wheezes, rales or rhonchi. Symmetric chest movement while on room air. CARDIO/VASC S1/S2 auscultated. Regular rate without appreciable murmurs, rubs, or gallops. No JVD or carotid bruits. Peripheral pulses equal bilaterally and palpable. No peripheral edema. GI Abdomen is soft without significant tenderness, masses, or guarding. Bowel sounds are normoactive. Rectal exam deferred.  No costovertebral angle tenderness. Normal appearing external genitalia. HEME/LYMPH No palpable cervical lymphadenopathy and no hepatosplenomegaly. No petechiae or ecchymoses.   MSK Spontaneous movement of all

## 2021-04-01 NOTE — CARE COORDINATION
LSW attempted to call [facility] [place] and it seems their phone lines are down. LSW went to their web site and found a email for admissions. LSW emailed the admission at the [facility]. Waiting on a response.

## 2021-04-01 NOTE — PROGRESS NOTES
My name is Ion Ama and I am a nursing student from [employer] assigned to this patient. Patient resting in her chair, resting her eyes, call light is within reach. Family member at bedside.

## 2021-04-02 VITALS
BODY MASS INDEX: 29.19 KG/M2 | SYSTOLIC BLOOD PRESSURE: 167 MMHG | OXYGEN SATURATION: 98 % | DIASTOLIC BLOOD PRESSURE: 68 MMHG | RESPIRATION RATE: 14 BRPM | HEIGHT: 57 IN | HEART RATE: 70 BPM | TEMPERATURE: 98.1 F | WEIGHT: 135.31 LBS

## 2021-04-02 LAB
CULTURE: NORMAL
CULTURE: NORMAL
Lab: NORMAL
Lab: NORMAL
SARS-COV-2, NAAT: NOT DETECTED
SPECIMEN: NORMAL
SPECIMEN: NORMAL
TSH HIGH SENSITIVITY: 4.67 UIU/ML (ref 0.27–4.2)

## 2021-04-02 PROCEDURE — U0003 INFECTIOUS AGENT DETECTION BY NUCLEIC ACID (DNA OR RNA); SEVERE ACUTE RESPIRATORY SYNDROME CORONAVIRUS 2 (SARS-COV-2) (CORONAVIRUS DISEASE [COVID-19]), AMPLIFIED PROBE TECHNIQUE, MAKING USE OF HIGH THROUGHPUT TECHNOLOGIES AS DESCRIBED BY CMS-2020-01-R: HCPCS

## 2021-04-02 PROCEDURE — 87635 SARS-COV-2 COVID-19 AMP PRB: CPT

## 2021-04-02 PROCEDURE — 86376 MICROSOMAL ANTIBODY EACH: CPT

## 2021-04-02 PROCEDURE — 86800 THYROGLOBULIN ANTIBODY: CPT

## 2021-04-02 PROCEDURE — 6370000000 HC RX 637 (ALT 250 FOR IP): Performed by: HOSPITALIST

## 2021-04-02 PROCEDURE — 94761 N-INVAS EAR/PLS OXIMETRY MLT: CPT

## 2021-04-02 PROCEDURE — 36415 COLL VENOUS BLD VENIPUNCTURE: CPT

## 2021-04-02 PROCEDURE — 84443 ASSAY THYROID STIM HORMONE: CPT

## 2021-04-02 PROCEDURE — 6370000000 HC RX 637 (ALT 250 FOR IP): Performed by: INTERNAL MEDICINE

## 2021-04-02 RX ORDER — HYDRALAZINE HYDROCHLORIDE 25 MG/1
75 TABLET, FILM COATED ORAL EVERY 8 HOURS SCHEDULED
Qty: 90 TABLET | Refills: 3 | Status: SHIPPED | OUTPATIENT
Start: 2021-04-02

## 2021-04-02 RX ORDER — ROSUVASTATIN CALCIUM 40 MG/1
40 TABLET, COATED ORAL NIGHTLY
Qty: 30 TABLET | Refills: 3 | Status: SHIPPED | OUTPATIENT
Start: 2021-04-02

## 2021-04-02 RX ORDER — AMLODIPINE BESYLATE 5 MG/1
5 TABLET ORAL DAILY PRN
Qty: 30 TABLET | Refills: 1 | Status: SHIPPED | OUTPATIENT
Start: 2021-04-02

## 2021-04-02 RX ORDER — METOPROLOL TARTRATE 50 MG/1
50 TABLET, FILM COATED ORAL 2 TIMES DAILY
Qty: 60 TABLET | Refills: 3 | Status: SHIPPED | OUTPATIENT
Start: 2021-04-02

## 2021-04-02 RX ORDER — AMLODIPINE BESYLATE 5 MG/1
5 TABLET ORAL DAILY
Qty: 30 TABLET | Refills: 3 | Status: SHIPPED | OUTPATIENT
Start: 2021-04-03

## 2021-04-02 RX ADMIN — ASPIRIN 81 MG: 81 TABLET, COATED ORAL at 08:12

## 2021-04-02 RX ADMIN — AMLODIPINE BESYLATE 5 MG: 5 TABLET ORAL at 02:51

## 2021-04-02 RX ADMIN — LOSARTAN POTASSIUM 100 MG: 100 TABLET, FILM COATED ORAL at 08:12

## 2021-04-02 RX ADMIN — OXYCODONE HYDROCHLORIDE AND ACETAMINOPHEN 1000 MG: 500 TABLET ORAL at 08:12

## 2021-04-02 RX ADMIN — LEVOTHYROXINE SODIUM 75 MCG: 0.07 TABLET ORAL at 05:48

## 2021-04-02 RX ADMIN — METOPROLOL TARTRATE 50 MG: 50 TABLET, FILM COATED ORAL at 08:12

## 2021-04-02 RX ADMIN — HYDRALAZINE HYDROCHLORIDE 75 MG: 50 TABLET ORAL at 05:47

## 2021-04-02 RX ADMIN — AMLODIPINE BESYLATE 5 MG: 5 TABLET ORAL at 08:12

## 2021-04-02 NOTE — CONSULTS
Endocrinology   Consult Note  Dear Doctor Mohit Dutton for the Consult   Pt. had already left the floor.   So was not able to see the patient  Thank you for your referral  Radha Mathews MD

## 2021-04-02 NOTE — PROGRESS NOTES
NEUROLOGY NOTE  DR. Caryl Kong MD.  -------------------------------------------------  Subjective:    Pt is doing better today    Denies any new symptoms      Doing better. Denies any new symptoms. Denies headache nausea vomiting dizziness    Denies numbness weakness extremities    Denies blurring of vision double vision    Objective:    BP (!) 220/88   Pulse 82   Temp 97 °F (36.1 °C) (Oral)   Resp 16   Ht 4' 9\" (1.448 m)   Wt 135 lb 5 oz (61.4 kg)   SpO2 97%   BMI 29.28 kg/m²   HEENT nl      Neuro exam    Alert Oriented  X 3  Follow simple commands  EOMI Pupils 3 mm ellie  5/5 all 4 extremities      RADIOLOGY  -----------------    Echocardiogram Complete 2d With Doppler With Color    Result Date: 3/30/2021  Transthoracic Echocardiography Report (TTE)  Demographics   Patient Name      NAVID COLON V        Date of Study       03/30/2021   Date of Birth     11/11/1920          Gender              Female   Age               80 year(s)         Race                   Patient Number    [id number]          Room Number         6648   Visit Number      [id number]   Corporate ID      [id number]   Accession Number  [id number]          Adry Ennis MD  Interpreting        Danya Garcia  Physician                             Physician           Jai LAST  Procedure Type of Study   TTE procedure:ECHOCARDIOGRAM COMPLETE 2D W DOPPLER W COLOR. Procedure Date Date: 03/30/2021 Start: 09:04 AM Study Location: Portable Technical Quality: Limited visualization due to patient immobility. Indications:TIA. Patient Status: Routine HR: 80 bpm BP: 222/64 mmHg  Conclusions   Summary  Technically difficult examination. Left ventricular systolic function is normal.  Ejection fraction is visually estimated at 50-55%. Indeterminate diastolic function; E/A flow reversal is noted.   Mild to moderate aortic regurgitation ( ms). No evidence of any pericardial effusion. Patient refused agitated saline bubble study. Signature   ------------------------------------------------------------------  Electronically signed by Jt Rowe MD  (Interpreting physician) on 03/30/2021 at 12:49 PM  ------------------------------------------------------------------   Findings   Left Ventricle  Left ventricular systolic function is normal.  Ejection fraction is visually estimated at 50-55%. Indeterminate diastolic function; E/A flow reversal is noted. Left Atrium  Essentially normal left atrium. Right Atrium  Essentially normal right atrium. Right Ventricle  Essentially normal right ventricle. Aortic Valve  Mild to moderate aortic regurgitation ( ms). Mitral Valve  Structurally normal mitral valve. Tricuspid Valve  Trace tricuspid regurgitation is present. Pulmonic Valve  The pulmonic valve was not well visualized. Pericardial Effusion  No evidence of any pericardial effusion. Miscellaneous  Patient refused agitated saline bubble study.   M-Mode/2D Measurements & Calculations   LV Diastolic Dimension:  LV Systolic Dimension:  LA Dimension: 3 cmAO Root  3.46 cm                  1.93 cm                 Dimension: 2.9 cmLA Area:  LV FS:44.2 %             LV Volume Diastolic: 51 01.1 cm2  LV PW Diastolic: 6.84 cm ml  LV PW Systolic: 7.52 cm  LV Volume Systolic: 16  Septum Diastolic: 5.71   ml  cm                       LV EDV/LV EDV Index: 51 RV Diastolic Dimension:  Septum Systolic: 4.50 cm mlLV ESV/LV ESV Index:  2.21 cm  CO: 4.1 l/min            16 ml                           EF Calculated (A4C):    LA/Aorta: 1.03                           68.6 %  LV Area Diastolic: 11.1  EF Calculated (2D):     LA volume/Index: 50 ml  cm2                      76.6 %  LV Area Systolic: 11 cm2                           LV Length: 7.11 cm                            LVOT: 1.9 cm  Doppler Measurements & Calculations   MV Peak E-Wave: 74.7    AV Peak Velocity: 184 cm/s   LVOT Peak Velocity:  cm/s                    AV Peak Gradient: 13.54 mmHg 87.4 cm/s  MV Peak A-Wave: 148     AV Mean Velocity: 116 cm/s   LVOT Mean Velocity:  cm/s                    AV Mean Gradient: 7 mmHg     65.3 cm/s  MV E/A Ratio: 0.5       AV VTI: 33.4 cm              LVOT Peak Gradient: 3  MV Peak Gradient: 2.23  AV Area (Continuity):1.54    mmHgLVOT Mean Gradient:  mmHg                    cm2                          2 mmHg   MV P1/2t: 38 msec       LVOT VTI: 18.1 cm  MVA by PHT:5.79 cm2     AV P1/2t: 416 msec   MV E' Septal Velocity:  6.47 cm/s  MV E' Lateral Velocity:  7.13 cm/s  MV E/E' septal: 11.55  MV E/E' lateral: 10.48      Ct Abdomen Pelvis Wo Contrast Additional Contrast? None    Result Date: 3/28/2021  EXAMINATION: CT OF THE ABDOMEN AND PELVIS WITHOUT CONTRAST 3/28/2021 1:16 pm TECHNIQUE: CT of the abdomen and pelvis was performed without the administration of intravenous contrast. Multiplanar reformatted images are provided for review. Dose modulation, iterative reconstruction, and/or weight based adjustment of the mA/kV was utilized to reduce the radiation dose to as low as reasonably achievable. COMPARISON: CT abdomen and pelvis 03/14/2021 HISTORY: ORDERING SYSTEM PROVIDED HISTORY: altered TECHNOLOGIST PROVIDED HISTORY: Reason for exam:->altered Additional Contrast?->None Decision Support Exception->Emergency Medical Condition (MA) Reason for Exam: ABD PAIN Acuity: Acute Type of Exam: Subsequent/Follow-up Relevant Medical/Surgical History: CTA ABD 2 WEEKS AGO FINDINGS: Lower Chest: Correlate with dictated report of CT chest performed at the same time as this exam. Organs: The liver attenuation and texture appears unremarkable. No discrete hepatic lesion or intrahepatic bile duct dilatation is seen.   The gallbladder has calcifications reflecting cholelithiasis without pericholecystic fluid or inflammatory change evident. The kidneys, spleen, adrenal glands and pancreas appear unremarkable. GI/Bowel: Multifocal diverticula involve the descending and sigmoid colon without evidence of acute inflammatory change. No diffuse or focal bowel wall thickening evident. No inflammatory changes evident. No obstruction is seen. The appendix is not clearly visualized. Pelvis: Hysterectomy. Partially filled urinary bladder appears unremarkable. No pelvic adenopathy or free fluid is seen. Vascular calcifications are noted reflecting calcific atherosclerosis. No pneumoperitoneum. Peritoneum/Retroperitoneum: Calcific atherosclerotic disease aorta. No aneurysm. Unremarkable appearance of the IVC. No adenopathy or fluid. No pneumoperitoneum. Bones/Soft Tissues: Degenerative changes throughout the lumbar spine with chronic grade 2 degenerative anterolisthesis L5 on S1. No acute superficial soft tissue or osseous structure abnormality evident. 1. Cholelithiasis without CT findings of acute cholecystitis. 2.  No CT evidence of an acute intra-abdominal or intrapelvic process. 3.  Calcific atherosclerotic disease aorta. 4.  Diverticulosis coli without CT evidence of acute diverticulitis. Ct Head Wo Contrast    Result Date: 3/28/2021  EXAMINATION: CT OF THE HEAD WITHOUT CONTRAST  3/28/2021 12:05 pm TECHNIQUE: CT of the head was performed without the administration of intravenous contrast. Dose modulation, iterative reconstruction, and/or weight based adjustment of the mA/kV was utilized to reduce the radiation dose to as low as reasonably achievable. COMPARISON: 09/14/2009. HISTORY: ORDERING SYSTEM PROVIDED HISTORY: altered TECHNOLOGIST PROVIDED HISTORY: Has a \"code stroke\" or \"stroke alert\" been called? ->No Reason for exam:->altered Decision Support Exception->Emergency Medical Condition (MA) Reason for Exam: altered Acuity: Acute Type of Exam: Initial FINDINGS: BRAIN/VENTRICLES: There is no acute intracranial hemorrhage, mass disease. 3.  Vascular calcifications are noted reflecting calcific atherosclerosis. Xr Chest Portable    Result Date: 3/28/2021  EXAMINATION: ONE XRAY VIEW OF THE CHEST 3/28/2021 11:27 am COMPARISON: 03/14/2021. HISTORY: ORDERING SYSTEM PROVIDED HISTORY: fatigue TECHNOLOGIST PROVIDED HISTORY: Reason for exam:->fatigue Reason for Exam: Extremity Weakness; Facial Droop; Altered Mental Status FINDINGS: The lungs are without acute focal process. No pleural effusion. Possible small right apical pneumothorax. .  The cardiomediastinal silhouette is without acute process. The osseous structures are without acute process. Possible small right apical pneumothorax. Xr Chest Portable    Result Date: 3/14/2021  EXAMINATION: ONE XRAY VIEW OF THE CHEST 3/14/2021 10:38 am COMPARISON: 02/24/2009 HISTORY: Mild hypoxia. FINDINGS: Heart is not enlarged. Severe atherosclerotic calcification of the aortic arch. No acute airspace disease, pleural effusion, pneumothorax. Osteopenia. No acute abnormality. Vl Dup Carotid Bilateral    Result Date: 3/29/2021  EXAMINATION: ULTRASOUND EVALUATION OF THE CAROTID ARTERIES 3/29/2021 5:37 am COMPARISON: Cervical spine CT 09/14/2009 HISTORY: ORDERING SYSTEM PROVIDED HISTORY: TIA TECHNOLOGIST PROVIDED HISTORY: Reason for exam:->TIA Reason for Exam: Dizziness Acuity: Acute Type of Exam: Ongoing FINDINGS: Patent common, internal, and external carotid arteries and vertebral arteries with antegrade flow. Tortuous carotid arteries especially on the right. Areas of turbulence in both the right common and internal carotid arteries. Suspected mild predominantly hard plaque in the right carotid bulb. Minimal to mild mixed plaque near the origin of the left internal carotid artery.  Peak systolic velocities as below: RIGHT Common carotid (proximal):  142 cm/s Common carotid (distal):  128 cm/s Internal carotid (proximal):  124 cm/s Internal carotid (mid):  107 cm/s Internal carotid (distal):  220 cm/s External carotid (proximal):  175 cm/s Vertebral:  42 cm/s ICA/CCA ratio: 1.5 LEFT Common carotid (proximal):  116 cm/s Common carotid (distal):  97 cm/s Internal carotid (proximal):  69 cm/s Internal carotid (mid):  77 cm/s Internal carotid (distal):  92 cm/s External carotid (proximal):  87 cm/s Vertebral:  79 cm/s ICA/CCA ratio:  0.8     1. 50-69% stenosis in the distal right internal carotid artery, likely closer to 69%. 2. Areas of turbulent flow in the right common and internal carotid arteries due in part to tortuosity but also likely related to underlying stenosis in the right internal carotid artery as above. 3. No hemodynamically significant stenosis in the left internal carotid artery. 4. Elevated peak systolic velocity in the right external carotid artery, suggesting underlying stenosis. 5. Patent vertebral arteries with antegrade flow. Cta Abdomen Pelvis W Contrast    Result Date: 3/14/2021  EXAMINATION: CTA OF THE ABDOMEN AND PELVIS WITH CONTRAST 3/14/2021 11:01 am TECHNIQUE: CTA of the abdomen and pelvis was performed with the administration of intravenous contrast. Multiplanar reformatted images are provided for review. MIP images are provided for review. Dose modulation, iterative reconstruction, and/or weight based adjustment of the mA/kV was utilized to reduce the radiation dose to as low as reasonably achievable. COMPARISON: None. HISTORY: ORDERING SYSTEM PROVIDED HISTORY: GIB protocol, abd pain, BRBPR TECHNOLOGIST PROVIDED HISTORY: Reason for exam:->GIB protocol, abd pain, BRBPR Decision Support Exception->Emergency Medical Condition (MA) Reason for Exam: RECTAL BLEEDING Acuity: Acute Type of Exam: Initial Relevant Medical/Surgical History: 85CC'S HHLQGD 343 FINDINGS: Nonvascular Lower Chest: Mild dependent atelectasis in the lower lobes. No acute infiltrate at the lung bases. Cardiomegaly is noted. Organs:  The liver, spleen, pancreas and adrenal glands are unremarkable. The gallbladder is mildly distended. There is poorly defined dependent soft tissue attenuation in the gallbladder neck, likely sludge. No biliary dilatation. No renal mass or significant hydronephrosis. GI/Bowel: Colonic diverticulosis with no acute features. Mild retained stool throughout the colon. No small bowel distension. The stomach and duodenal sweep are intact. Pelvis: No pelvic mass or free pelvic fluid. The uterus is absent. Mild distention of the urinary bladder. Peritoneum/Retroperitoneum: There is no retroperitoneal adenopathy or upper abdominal ascites. Bones/Soft Tissues: No acute osseous or soft tissue abnormality. Osteopenia with multilevel degenerative disc disease throughout the lumbar spine. There is a levoscoliosis of the lumbar spine centered at L2. VASCULAR Moderate calcified atherosclerotic plaque throughout the distal thoracic aorta and abdominal aorta. 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 Plaque in the proximal celiac artery and SMA. There is a 50% to 70% stenosis of the SMA and no significant celiac artery stenosis. The BEBE is patent. Single renal artery supplying each kidney with proximal plaque. The plaque obscures the origins with no focal high-grade stenosis appreciated. Calcified atherosclerotic plaque in the common iliac arteries and proximal femoral circulation bilaterally. No significant inflow disease. Bilateral SFA stenoses. There are branch stenoses within the left profunda femoral circulation. The internal iliac arteries are patent bilaterally with a moderate stenosis at the origin of the left internal iliac artery. Evaluation for active arterial bleeding is limited by the lack of initial noncontrast imaging and delayed imaging.   There is no evidence of active arterial bleeding. Mildly prominent arterial vessels are noted in the anterior wall of the rectum and anus. 1. No evidence of active arterial bleeding on arterial phase imaging. Mildly prominent arterial vessels in the anterior wall of the rectum and anus. 2. Moderately severe aortoiliac atherosclerotic disease. 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 No significant inflow disease. Bilateral proximal SFA stenoses. 3. 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 Heavy plaque in the proximal renal arteries limits evaluation. 4. Colonic diverticulosis with no acute features. Vl Dup Lower Extremity Venous Left    Result Date: 3/15/2021  EXAMINATION: DUPLEX VENOUS ULTRASOUND OF THE LEFT LOWER EXTREMITY 3/15/2021 8:30 pm TECHNIQUE: Duplex ultrasound using B-mode/gray scaled imaging and Doppler spectral analysis and color flow was obtained of the left lower extremity. COMPARISON: None. HISTORY: ORDERING SYSTEM PROVIDED HISTORY: leg pain TECHNOLOGIST PROVIDED HISTORY: Reason for exam:->leg pain Reason for Exam: lt leg pain Acuity: Acute Type of Exam: Initial FINDINGS: The visualized veins of the left lower extremity are patent and free of echogenic thrombus. The veins demonstrate good compressibility with normal color flow study and spectral analysis. No evidence of DVT in the left lower extremity. Mri Brain Without Contrast    Result Date: 3/29/2021  EXAMINATION: MRI OF THE BRAIN WITHOUT CONTRAST,  3/29/2021 1:24 pm TECHNIQUE: Multiplanar multisequence MRI of the brain was performed without the administration of intravenous contrast. COMPARISON: Head CT 03/28/2021, MRI brain 02/20/2009 HISTORY: ORDERING SYSTEM PROVIDED HISTORY: TIA TECHNOLOGIST PROVIDED HISTORY: Reason for exam: TIA Decision Support Exception:  Emergency Medical Condition (MA) Reason for Exam:  Facial droop extremity weakness.  FINDINGS: INTRACRANIAL STRUCTURES/VENTRICLES:  No acute Hypertensive encephalopathy     TIA r/o cardio carotid embolic event     Recent GI bleed from diverticulitis per Son     Cerebellar microhemorrhages-cavernous malformations-Chronic     Right carotid 50-69 % stenosis     Right vertebral artery slow flow vs occlusion     PLAN:     Restart asa pt stopped taking recently as she was on Voltaren gel for her right shoulder arm pain from a fall ( due to recent GI bleed Plavix was felt to increase the risk espescially as she stopped asa recently )     Mri brain as above     CTA head neck as above     Echo neg     B 12 folate nl    Mildly elevated  TSH -consult endo    Nl homocysteine level     Discussed dx prognosis meds side effects and above with pt and her son and answered all questions.           Electronically signed by Lashell Costa MD on 4/1/2021 at 10:03 PM

## 2021-04-02 NOTE — CARE COORDINATION
LSW received discharge order for pt to go to the SNF. LSW called Vianca Kelly with [facility] and informed her of the discharge. LSW informed Vianca Kelly that the pt's son plans to transport pt. Vianca Kelly stated that son will need to be informed that the son will need to ring the doorbell when he gets their and someone will come get the pt. The son will not be able to come int. The son will have to make an appt to visit pt. Visitation is M-F from 10-4. Pt's son informed of this info. LSW faxed pt [facility]NIE. PASS-R completed and submitted to [facility] and copy placed in the  [facility] packet. RN is aware of pt discharge and the son will transport. RN will need to call report to [phone].

## 2021-04-02 NOTE — DISCHARGE SUMMARY
Discharge Summary    Name:  Barry Charles /Age/Sex: 1920  (Chiki Barbara [age] y.o. female)   MRN & CSN:  [id number] & [id number] Admission Date/Time: 3/28/2021 11:15 AM   Attending:  Jose Page MD Discharging Physician: Aníbal Angelse MD     HPI and Hospital Course:   Barry Charles is a Chiki Barbara [age] y.o.  female  who presented with confusion    HPI-as per H and [address]- presented with mild confusion, no speech issues, no weakness   -imaging including MRI/CT head with no acute CVA- echo with no thrombus and preserved EF- on ASA per neuro. ?due HTN crisis  2-Hypertensive crisis- SBP still uncontrolled- on olmesartan/cardizem as an outpatient, needed several adjustments in her medications- now on norvasc/BB/hydralaizine with holding parameters. Son has a machine which appears more accurate checking BP at home- okay with SBP in 140-150s at this time. 3-Transient lactic acidosis - resolved   4-Weakness and debility -age-related, awaiting placement  5-Hypothyroidism -on Synthroid     Going to [facility]. Code status changed to [facility]- discussed with both patient and her son at the bedside- paper work filled.         Consults this admission:  IP CONSULT TO HOSPITALIST  IP CONSULT TO NEUROLOGY  IP CONSULT TO ENDOCRINOLOGY    Discharge Instruction:   Follow up appointments:   Primary care physician:  within 2 weeks    Diet:  General/cardiac/ADA/as tolerated  Activity: {discharge activity: as tolerated  Disposition: Discharged to:   []Home, []C, [x]SNF, []Acute Rehab, []Hospice   Condition on discharge: Stable    Discharge Medications:      [facility]   Home Medication Instructions ZCE:[id number]    Printed on:21 1018   Medication Information                      amLODIPine (NORVASC) 5 MG tablet  Take 1 tablet by mouth daily Hold for SBP<130             amLODIPine (NORVASC) 5 MG tablet  Take 1 tablet by mouth daily as needed (if SBP>160)             Ascorbic Acid (VITAMIN C) 250 MG tablet

## 2021-04-03 LAB — ANTITHYROID MICORSOMAL: 1.3 IU/ML (ref 0–9)

## 2021-04-04 LAB — ANTITHYROGLOBULIN AB: <0.9 IU/ML (ref 0–4)

## 2021-04-05 LAB
EKG ATRIAL RATE: 80 BPM
EKG DIAGNOSIS: NORMAL
EKG P AXIS: 51 DEGREES
EKG P-R INTERVAL: 198 MS
EKG Q-T INTERVAL: 392 MS
EKG QRS DURATION: 82 MS
EKG QTC CALCULATION (BAZETT): 452 MS
EKG R AXIS: 27 DEGREES
EKG T AXIS: 45 DEGREES
EKG VENTRICULAR RATE: 80 BPM

## 2021-05-21 ENCOUNTER — HOSPITAL ENCOUNTER (EMERGENCY)
Age: 86
Discharge: HOME | End: 2021-05-21
Attending: EMERGENCY MEDICINE
Payer: MEDICARE

## 2021-05-21 ENCOUNTER — APPOINTMENT (OUTPATIENT)
Dept: CT IMAGING | Age: 86
End: 2021-05-21
Payer: MEDICARE

## 2021-05-21 ENCOUNTER — APPOINTMENT (OUTPATIENT)
Dept: GENERAL RADIOLOGY | Age: 86
End: 2021-05-21
Payer: MEDICARE

## 2021-05-21 VITALS
TEMPERATURE: 98.2 F | OXYGEN SATURATION: 97 % | BODY MASS INDEX: 29.16 KG/M2 | DIASTOLIC BLOOD PRESSURE: 61 MMHG | HEIGHT: 55 IN | WEIGHT: 126 LBS | RESPIRATION RATE: 22 BRPM | HEART RATE: 71 BPM | SYSTOLIC BLOOD PRESSURE: 147 MMHG

## 2021-05-21 DIAGNOSIS — I71.40 ABDOMINAL AORTIC ANEURYSM (AAA) WITHOUT RUPTURE: ICD-10-CM

## 2021-05-21 DIAGNOSIS — T14.8XXA BRUISING: Primary | ICD-10-CM

## 2021-05-21 PROCEDURE — 99284 EMERGENCY DEPT VISIT MOD MDM: CPT

## 2021-05-21 PROCEDURE — 70486 CT MAXILLOFACIAL W/O DYE: CPT

## 2021-05-21 PROCEDURE — 74176 CT ABD & PELVIS W/O CONTRAST: CPT

## 2021-05-21 PROCEDURE — 70450 CT HEAD/BRAIN W/O DYE: CPT

## 2021-05-21 PROCEDURE — 73090 X-RAY EXAM OF FOREARM: CPT

## 2021-05-21 PROCEDURE — 72125 CT NECK SPINE W/O DYE: CPT

## 2021-05-21 PROCEDURE — 71250 CT THORAX DX C-: CPT

## 2021-05-21 ASSESSMENT — ENCOUNTER SYMPTOMS
EYE PAIN: 0
NAUSEA: 0
SHORTNESS OF BREATH: 0
BACK PAIN: 0
SORE THROAT: 0
ABDOMINAL PAIN: 0
VOMITING: 0
RHINORRHEA: 0
COUGH: 0
EYE DISCHARGE: 0

## 2021-05-21 NOTE — ED NOTES
Up to [facility] with assist. Pt voided with no difficulty.  Female visitor at bedside     [facility]  05/21/21 [id number]

## 2021-05-21 NOTE — ED NOTES
Bruise noted yellow purple tint Right axilla. Ecchymosis noted on Right Forearm from wrist to [facility]. Ecchymosis noted around right eye and across bridge of nose under left eye. Subconjunctival hemorrhage inner canthus right eye. Ecchymosis right lower abd/groin area. Wound discussed with physician.      Evert William RN  05/21/21 [id number]

## 2021-05-21 NOTE — ED NOTES
Discharge instructions reviewed with patient. Reviewed prescriptions with patient. No additional questions asked. Voiced understanding. Encouraged patient to follow up as discussed by the ED physician.      Inez Sethi RN  05/21/21 [id number]
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
## (undated) DEVICE — Z DISCONTINUED NO SUB IDED TUBING ETCO2 AD L6.5FT NSL ORAL CVD PRNG NONFLARED TIP OVR